# Patient Record
Sex: MALE | Race: WHITE | Employment: OTHER | URBAN - METROPOLITAN AREA
[De-identification: names, ages, dates, MRNs, and addresses within clinical notes are randomized per-mention and may not be internally consistent; named-entity substitution may affect disease eponyms.]

---

## 2018-03-23 RX ORDER — GUAIFENESIN 100 MG/5ML
81 LIQUID (ML) ORAL DAILY
COMMUNITY

## 2018-03-23 NOTE — PROGRESS NOTES
PT aware of NPO status. NPO after MN night prior to procedure. PT aware of need to hold anticoagulants per protocol. Last dose of baby aspirin Friday 3/23/18 at 0530. PT aware of potential for sedation administration and need for  at discharge. PT aware of arrival time pre procedure. Arrive at THE [facility] radiology waiting room on 3/28/18 at [address] Pt states no questions at this time. Jennifer Sharp pt [facility] radiology [phone].

## 2018-03-28 ENCOUNTER — HOSPITAL ENCOUNTER (OUTPATIENT)
Dept: CT IMAGING | Age: 73
Discharge: HOME | End: 2018-03-28
Attending: UROLOGY | Admitting: RADIOLOGY
Payer: MEDICARE

## 2018-03-28 ENCOUNTER — APPOINTMENT (OUTPATIENT)
Dept: GENERAL RADIOLOGY | Age: 73
End: 2018-03-28
Attending: RADIOLOGY
Payer: MEDICARE

## 2018-03-28 VITALS
DIASTOLIC BLOOD PRESSURE: 78 MMHG | HEIGHT: 73 IN | WEIGHT: 241.5 LBS | BODY MASS INDEX: 32.01 KG/M2 | RESPIRATION RATE: 16 BRPM | HEART RATE: 66 BPM | SYSTOLIC BLOOD PRESSURE: 132 MMHG | OXYGEN SATURATION: 97 % | TEMPERATURE: 97.9 F

## 2018-03-28 DIAGNOSIS — N28.1 CYST OF KIDNEY, ACQUIRED: ICD-10-CM

## 2018-03-28 LAB
ANION GAP SERPL CALC-SCNC: 8 MMOL/L (ref 3–18)
APTT PPP: 28.3 SEC (ref 23–36.4)
BASOPHILS # BLD: 0 K/UL (ref 0–0.06)
BASOPHILS NFR BLD: 0 % (ref 0–2)
BUN SERPL-MCNC: 21 MG/DL (ref 7–18)
BUN/CREAT SERPL: 17 (ref 12–20)
CALCIUM SERPL-MCNC: 8.8 MG/DL (ref 8.5–10.1)
CHLORIDE SERPL-SCNC: 110 MMOL/L (ref 100–108)
CO2 SERPL-SCNC: 27 MMOL/L (ref 21–32)
CREAT SERPL-MCNC: 1.25 MG/DL (ref 0.6–1.3)
DIFFERENTIAL METHOD BLD: ABNORMAL
EOSINOPHIL # BLD: 0.1 K/UL (ref 0–0.4)
EOSINOPHIL NFR BLD: 2 % (ref 0–5)
ERYTHROCYTE [DISTWIDTH] IN BLOOD BY AUTOMATED COUNT: 12.3 % (ref 11.6–14.5)
GLUCOSE SERPL-MCNC: 107 MG/DL (ref 74–99)
HCT VFR BLD AUTO: 45.1 % (ref 36–48)
HGB BLD-MCNC: 15.1 G/DL (ref 13–16)
INR PPP: 1 (ref 0.8–1.2)
LYMPHOCYTES # BLD: 1.5 K/UL (ref 0.9–3.6)
LYMPHOCYTES NFR BLD: 31 % (ref 21–52)
MCH RBC QN AUTO: 31 PG (ref 24–34)
MCHC RBC AUTO-ENTMCNC: 33.5 G/DL (ref 31–37)
MCV RBC AUTO: 92.6 FL (ref 74–97)
MONOCYTES # BLD: 0.6 K/UL (ref 0.05–1.2)
MONOCYTES NFR BLD: 13 % (ref 3–10)
NEUTS SEG # BLD: 2.6 K/UL (ref 1.8–8)
NEUTS SEG NFR BLD: 54 % (ref 40–73)
PLATELET # BLD AUTO: 194 K/UL (ref 135–420)
PMV BLD AUTO: 10.9 FL (ref 9.2–11.8)
POTASSIUM SERPL-SCNC: 4.5 MMOL/L (ref 3.5–5.5)
PROTHROMBIN TIME: 12.5 SEC (ref 11.5–15.2)
RBC # BLD AUTO: 4.87 M/UL (ref 4.7–5.5)
SODIUM SERPL-SCNC: 145 MMOL/L (ref 136–145)
WBC # BLD AUTO: 4.8 K/UL (ref 4.6–13.2)

## 2018-03-28 PROCEDURE — 85025 COMPLETE CBC W/AUTO DIFF WBC: CPT | Performed by: UROLOGY

## 2018-03-28 PROCEDURE — 74011000250 HC RX REV CODE- 250

## 2018-03-28 PROCEDURE — 71046 X-RAY EXAM CHEST 2 VIEWS: CPT

## 2018-03-28 PROCEDURE — 80048 BASIC METABOLIC PNL TOTAL CA: CPT | Performed by: UROLOGY

## 2018-03-28 PROCEDURE — 88305 TISSUE EXAM BY PATHOLOGIST: CPT | Performed by: UROLOGY

## 2018-03-28 PROCEDURE — 74011000250 HC RX REV CODE- 250: Performed by: RADIOLOGY

## 2018-03-28 PROCEDURE — 74011250636 HC RX REV CODE- 250/636

## 2018-03-28 PROCEDURE — 74011250636 HC RX REV CODE- 250/636: Performed by: RADIOLOGY

## 2018-03-28 PROCEDURE — 88112 CYTOPATH CELL ENHANCE TECH: CPT | Performed by: UROLOGY

## 2018-03-28 PROCEDURE — 85610 PROTHROMBIN TIME: CPT | Performed by: UROLOGY

## 2018-03-28 PROCEDURE — 85730 THROMBOPLASTIN TIME PARTIAL: CPT | Performed by: UROLOGY

## 2018-03-28 PROCEDURE — 77012 CT SCAN FOR NEEDLE BIOPSY: CPT

## 2018-03-28 RX ORDER — SODIUM BICARBONATE 1 MEQ/ML
SYRINGE (ML) INTRAVENOUS
Status: COMPLETED
Start: 2018-03-28 | End: 2018-03-28

## 2018-03-28 RX ORDER — SODIUM CHLORIDE 9 MG/ML
25 INJECTION, SOLUTION INTRAVENOUS CONTINUOUS
Status: DISCONTINUED | OUTPATIENT
Start: 2018-03-28 | End: 2018-03-28 | Stop reason: HOSPADM

## 2018-03-28 RX ORDER — LIDOCAINE HYDROCHLORIDE 10 MG/ML
INJECTION, SOLUTION EPIDURAL; INFILTRATION; INTRACAUDAL; PERINEURAL
Status: DISCONTINUED
Start: 2018-03-28 | End: 2018-03-28 | Stop reason: HOSPADM

## 2018-03-28 RX ORDER — FLUMAZENIL 0.1 MG/ML
0.2 INJECTION INTRAVENOUS
Status: DISCONTINUED | OUTPATIENT
Start: 2018-03-28 | End: 2018-03-28 | Stop reason: HOSPADM

## 2018-03-28 RX ORDER — LIDOCAINE HYDROCHLORIDE 10 MG/ML
1-20 INJECTION INFILTRATION; PERINEURAL
Status: DISCONTINUED | OUTPATIENT
Start: 2018-03-28 | End: 2018-03-28 | Stop reason: HOSPADM

## 2018-03-28 RX ORDER — MIDAZOLAM HYDROCHLORIDE 1 MG/ML
INJECTION, SOLUTION INTRAMUSCULAR; INTRAVENOUS
Status: DISCONTINUED
Start: 2018-03-28 | End: 2018-03-28 | Stop reason: HOSPADM

## 2018-03-28 RX ORDER — FENTANYL CITRATE 50 UG/ML
25-200 INJECTION, SOLUTION INTRAMUSCULAR; INTRAVENOUS
Status: DISCONTINUED | OUTPATIENT
Start: 2018-03-28 | End: 2018-03-28 | Stop reason: HOSPADM

## 2018-03-28 RX ORDER — SODIUM BICARBONATE 84 MG/ML
1 INJECTION, SOLUTION INTRAVENOUS
Status: DISCONTINUED | OUTPATIENT
Start: 2018-03-28 | End: 2018-03-28 | Stop reason: HOSPADM

## 2018-03-28 RX ORDER — NALOXONE HYDROCHLORIDE 0.4 MG/ML
0.1 INJECTION, SOLUTION INTRAMUSCULAR; INTRAVENOUS; SUBCUTANEOUS AS NEEDED
Status: DISCONTINUED | OUTPATIENT
Start: 2018-03-28 | End: 2018-03-28 | Stop reason: HOSPADM

## 2018-03-28 RX ADMIN — LIDOCAINE HYDROCHLORIDE 9 ML: 10 INJECTION, SOLUTION INFILTRATION; PERINEURAL at 11:33

## 2018-03-28 RX ADMIN — SODIUM CHLORIDE 25 ML/HR: 900 INJECTION, SOLUTION INTRAVENOUS at 09:16

## 2018-03-28 RX ADMIN — SODIUM BICARBONATE 1 MEQ: 84 INJECTION, SOLUTION INTRAVENOUS at 11:33

## 2018-03-28 NOTE — PROGRESS NOTES
Modality for renal cyst drainage changed from CT to US per Dr. Chante Cabrales request/preference (interventional radiologist). See [facility] order.

## 2018-03-28 NOTE — PROGRESS NOTES
VASCULAR & INTERVENTIONAL RADIOLOGY PROGRESS NOTE    History and Physical reviewed; I have examined the patient and there are no pertinent changes. Pt is an appropriate candidate to undergo right renal cyst aspiration under moderate sedation.       Sonja Landaverde MD, MD  Vascular & Interventional Radiology  [facility]  3/28/2018

## 2018-03-28 NOTE — PERIOP NOTES
Reviewed PTA medication list with patient/caregiver and patient/caregiver denies any additional medications. Patient admits to having a responsible adult care for them for at least 24 hours after surgery.     0925 am- pt is prepped and ready for the procedure.

## 2018-03-28 NOTE — DISCHARGE INSTRUCTIONS
DISCHARGE SUMMARY from Nurse    PATIENT INSTRUCTIONS:    After general anesthesia or intravenous sedation, for 24 hours or while taking prescription Narcotics:  · Limit your activities  · Do not drive and operate hazardous machinery  · Do not make important personal or business decisions  · Do  not drink alcoholic beverages  · If you have not urinated within 8 hours after discharge, please contact your surgeon on call. Report the following to your surgeon:  · Excessive pain, swelling, redness or odor of or around the surgical area  · Temperature over 100.5  · Nausea and vomiting lasting longer than 4 hours or if unable to take medications  · Any signs of decreased circulation or nerve impairment to extremity: change in color, persistent  numbness, tingling, coldness or increase pain  · Any questions    What to do at Home:  Recommended activity: Activity as tolerated and no driving for today,     *  Please give a list of your current medications to your Primary Care Provider. *  Please update this list whenever your medications are discontinued, doses are      changed, or new medications (including over-the-counter products) are added. *  Please carry medication information at all times in case of emergency situations. These are general instructions for a healthy lifestyle:    No smoking/ No tobacco products/ Avoid exposure to second hand smoke  Surgeon General's Warning:  Quitting smoking now greatly reduces serious risk to your health. Obesity, smoking, and sedentary lifestyle greatly increases your risk for illness    A healthy diet, regular physical exercise & weight monitoring are important for maintaining a healthy lifestyle    You may be retaining fluid if you have a history of heart failure or if you experience any of the following symptoms:  Weight gain of 3 pounds or more overnight or 5 pounds in a week, increased swelling in our hands or feet or shortness of breath while lying flat in bed. Please call your doctor as soon as you notice any of these symptoms; do not wait until your next office visit. Recognize signs and symptoms of STROKE:    F-face looks uneven    A-arms unable to move or move unevenly    S-speech slurred or non-existent    T-time-call 911 as soon as signs and symptoms begin-DO NOT go       Back to bed or wait to see if you get better-TIME IS BRAIN. Warning Signs of HEART ATTACK     Call 911 if you have these symptoms:   Chest discomfort. Most heart attacks involve discomfort in the center of the chest that lasts more than a few minutes, or that goes away and comes back. It can feel like uncomfortable pressure, squeezing, fullness, or pain.  Discomfort in other areas of the upper body. Symptoms can include pain or discomfort in one or both arms, the back, neck, jaw, or stomach.  Shortness of breath with or without chest discomfort.  Other signs may include breaking out in a cold sweat, nausea, or lightheadedness. Don't wait more than five minutes to call 911 - MINUTES MATTER! Fast action can save your life. Calling 911 is almost always the fastest way to get lifesaving treatment. Emergency Medical Services staff can begin treatment when they arrive -- up to an hour sooner than if someone gets to the hospital by car. The discharge information has been reviewed with the patient. The patient verbalized understanding. Discharge medications reviewed with the patient and appropriate educational materials and side effects teaching were provided.   ______________________Patient armband removed and shredded  _____________________________________________________________________________________________________________

## 2018-03-28 NOTE — PROGRESS NOTES
Discharge inst given and reviewed with pt and sig other,  Both verbalize understanding. Dressing to right chest area clean, dry and intact .   Dr Rosana Longo    office called, will call pt this afternoon to arrange follow up  Arm bands removed and shredded

## 2018-03-28 NOTE — PROGRESS NOTES
115ml clear yellow urine drained from right renal cyst by Dr. Calloway  via CT guidance. Specimen sent to lab for cytology per Dr. Roberta Rodriguez written order.

## 2018-03-28 NOTE — PROGRESS NOTES
TRANSFER - OUT REPORT:    Verbal report given to FABIOLA Ma(name) on Chipper Blizzard  being transferred to Care Unit(unit) for routine progression of care       Report consisted of patients Situation, Background, Assessment and   Recommendations(SBAR). Information from the following report(s) SBAR, Kardex and MAR was reviewed with the receiving nurse. Lines:   Peripheral IV 03/28/18 Right; Lower Forearm (Active)   Site Assessment Clean, dry, & intact 3/28/2018  9:16 AM   Phlebitis Assessment 0 3/28/2018  9:16 AM   Infiltration Assessment 0 3/28/2018  9:16 AM   Dressing Status Clean, dry, & intact 3/28/2018  9:16 AM   Dressing Type Tape;Transparent 3/28/2018  9:16 AM   Hub Color/Line Status Pink; Infusing 3/28/2018  9:16 AM        Opportunity for questions and clarification was provided.       Patient transported with:   Registered Nurse

## 2018-03-28 NOTE — PROGRESS NOTES
Procedure changed from ultrasound to CT per IR prefeference. Pt arrived on unit; Pt Alert and Oriented; Consent signed; See chart for sedation report and/or vital signs. Pt transferred to treatment table for procedure.

## 2018-03-28 NOTE — IP AVS SNAPSHOT
Maryjane Moctezuma 
 
 
 [address] [facility] [address] 
[phone] Patient: Kyra Habermann MRN: [id number] BXT:[id number] About your hospitalization You were admitted on:  March 28, 2018 You last received care in the:  [address] You were discharged on:  March 28, 2018 Why you were hospitalized Your primary diagnosis was:  Not on File Follow-up Information Follow up With Details Comments Contact Info Bandar Hung MD On 3/28/2018 F/U AS SCHEDULE [address] [facility] 
[phone] Jami Ceja MD   Patient can only remember the practice name and not the physician Discharge Orders None A check fidelia indicates which time of day the medication should be taken. My Medications CONTINUE taking these medications Instructions Each Dose to Equal  
 Morning Noon Evening Bedtime  
 aspirin 81 mg chewable tablet Your last dose was: Your next dose is: Take 81 mg by mouth daily. 81 mg  
    
   
   
   
  
 LISINOPRIL PO Your last dose was: Your next dose is: Take  by mouth daily. Omega-3 Fatty Acids 60- mg Cpdr  
   
Your last dose was: Your next dose is: Take  by mouth daily. OTHER Your last dose was: Your next dose is:    
   
   
 1 Tab. \"statin\" 1 Tab VERAPAMIL PO Your last dose was: Your next dose is: Take  by mouth. Discharge Instructions DISCHARGE SUMMARY from Nurse PATIENT INSTRUCTIONS: 
 
 
F-face looks uneven A-arms unable to move or move unevenly S-speech slurred or non-existent T-time-call 911 as soon as signs and symptoms begin-DO NOT go  
 Back to bed or wait to see if you get better-TIME IS BRAIN. Warning Signs of HEART ATTACK Call 911 if you have these symptoms: 
? Chest discomfort. Most heart attacks involve discomfort in the center of the chest that lasts more than a few minutes, or that goes away and comes back. It can feel like uncomfortable pressure, squeezing, fullness, or pain. ? Discomfort in other areas of the upper body. Symptoms can include pain or discomfort in one or both arms, the back, neck, jaw, or stomach. ? Shortness of breath with or without chest discomfort. ? Other signs may include breaking out in a cold sweat, nausea, or lightheadedness. Don't wait more than five minutes to call [address]! Fast action can save your life. Calling 911 is almost always the fastest way to get lifesaving treatment. Emergency Medical Services staff can begin treatment when they arrive  up to an hour sooner than if someone gets to the hospital by car. The discharge information has been reviewed with the patient. The patient verbalized understanding. Discharge medications reviewed with the patient and appropriate educational materials and side effects teaching were provided. ______________________Patient armband removed and shredded 
_____________________________________________________________________________________________________________ Introducing [facility]! [employer] introduces "[employer]" patient portal. Now you can access parts of your medical record, email your doctor's office, and request medication refills online. 1. In your internet browser, go to [email or web address] 2. Click on the First Time User? Click Here link in the Sign In box. You will see the New Member Sign Up page. 3. Enter your "[employer]" Access Code exactly as it appears below. You will not need to use this code after youve completed the sign-up process.  If you do not sign up before the expiration date, you must request a new code. · "[employer]" Access Code: [email or web address] Expires: 6/17/2018  1:36 PM 
 
4. Enter the last four digits of your Social Security Number (xxxx) and Date of Birth (mm/dd/yyyy) as indicated and click Submit. You will be taken to the next sign-up page. 5. Create a [employer] ID. This will be your "[employer]" login ID and cannot be changed, so think of one that is secure and easy to remember. 6. Create a "[employer]" password. You can change your password at any time. 7. Enter your Password Reset Question and Answer. This can be used at a later time if you forget your password. 8. Enter your e-mail address. You will receive e-mail notification when new information is available in [address] 9. Click Sign Up. You can now view and download portions of your medical record. 10. Click the Download Summary menu link to download a portable copy of your medical information. If you have questions, please visit the Frequently Asked Questions section of the "[employer]" website. Remember, "[employer]" is NOT to be used for urgent needs. For medical emergencies, dial 911. Now available from your iPhone and Android! Introducing Edmundo Bullock As a [facility] patient, I wanted to make you aware of our electronic visit tool called Edmundo Bullock. [facility] 24/7 allows you to connect within minutes with a medical provider 24 hours a day, seven days a week via a mobile device or tablet or logging into a secure website from your computer. You can access Edmundo Bullock from anywhere in the [place].  
 
A virtual visit might be right for you when you have a simple condition and feel like you just dont want to get out of bed, or cant get away from work for an appointment, when your regular [facility] provider is not available (evenings, weekends or holidays), or when youre out of town and need minor care. Electronic visits cost only $49 and if the [address] [place] [address] 24/7 provider determines a prescription is needed to treat your condition, one can be electronically transmitted to a nearby pharmacy*. Please take a moment to enroll today if you have not already done so. The enrollment process is free and takes just a few minutes. To enroll, please download the [employer] [facility] 24/7 nisa to your tablet or phone, or visit [email or web address] to enroll on your computer. And, as an [address] [place] [address] patient with a [employer] account, the results of your visits will be scanned into your electronic medical record and your primary care provider will be able to view the scanned results. We urge you to continue to see your regular [address] [place] [address] provider for your ongoing medical care. And while your primary care provider may not be the one available when you seek a [employer] virtual visit, the peace of mind you get from getting a real diagnosis real time can be priceless. For more information on [employer], view our Frequently Asked Questions (FAQs) at [email or web address]. Sincerely, 
 
Rubén Sandoval MD 
Chief Medical Officer [address] *:  certain medications cannot be prescribed via [employer] Providers Seen During Your Hospitalization Provider Specialty Primary office phone Ashwini Martin MD Urology [phone] Your Primary Care Physician (PCP) Primary Care Physician Office Phone Office Fax OTHER, PHYS ** None ** ** None ** You are allergic to the following No active allergies Recent Documentation Height Weight BMI Smoking Status 1.854 m 109.5 kg 31.86 kg/m2 Never Smoker Emergency Contacts Name Discharge Info Relation Home Work [facility] DISCHARGE CAREGIVER [3] Friend [5]   [phone] Patient Belongings The following personal items are in your possession at time of discharge: 
                             
 
  
  
 Please provide this summary of care documentation to your next provider. Signatures-by signing, you are acknowledging that this After Visit Summary has been reviewed with you and you have received a copy. Patient Signature:  ____________________________________________________________ Date:  ____________________________________________________________  
  
[facility] Provider Signature:  ____________________________________________________________ Date:  ____________________________________________________________

## 2018-08-07 ENCOUNTER — HOSPITAL ENCOUNTER (OUTPATIENT)
Dept: CT IMAGING | Age: 73
Discharge: HOME | End: 2018-08-07
Attending: RADIOLOGY | Admitting: RADIOLOGY
Payer: MEDICARE

## 2018-08-07 ENCOUNTER — APPOINTMENT (OUTPATIENT)
Dept: CT IMAGING | Age: 73
End: 2018-08-07
Attending: UROLOGY
Payer: MEDICARE

## 2018-08-07 VITALS
RESPIRATION RATE: 20 BRPM | OXYGEN SATURATION: 97 % | WEIGHT: 235 LBS | TEMPERATURE: 97.5 F | SYSTOLIC BLOOD PRESSURE: 110 MMHG | BODY MASS INDEX: 31.14 KG/M2 | HEART RATE: 50 BPM | HEIGHT: 73 IN | DIASTOLIC BLOOD PRESSURE: 50 MMHG

## 2018-08-07 DIAGNOSIS — N28.1 RENAL CYST: ICD-10-CM

## 2018-08-07 DIAGNOSIS — N28.1 CYST OF KIDNEY, ACQUIRED: ICD-10-CM

## 2018-08-07 LAB — APTT PPP: 27.2 SEC (ref 23–36.4)

## 2018-08-07 PROCEDURE — 76942 ECHO GUIDE FOR BIOPSY: CPT

## 2018-08-07 PROCEDURE — 77012 CT SCAN FOR NEEDLE BIOPSY: CPT

## 2018-08-07 PROCEDURE — 99152 MOD SED SAME PHYS/QHP 5/>YRS: CPT

## 2018-08-07 PROCEDURE — 36415 COLL VENOUS BLD VENIPUNCTURE: CPT | Performed by: UROLOGY

## 2018-08-07 PROCEDURE — 85730 THROMBOPLASTIN TIME PARTIAL: CPT | Performed by: UROLOGY

## 2018-08-07 PROCEDURE — 74011000250 HC RX REV CODE- 250: Performed by: RADIOLOGY

## 2018-08-07 PROCEDURE — 74011250636 HC RX REV CODE- 250/636: Performed by: RADIOLOGY

## 2018-08-07 PROCEDURE — 76080 X-RAY EXAM OF FISTULA: CPT

## 2018-08-07 PROCEDURE — 99153 MOD SED SAME PHYS/QHP EA: CPT

## 2018-08-07 PROCEDURE — 74011000250 HC RX REV CODE- 250

## 2018-08-07 RX ORDER — SODIUM CHLORIDE 9 MG/ML
25 INJECTION, SOLUTION INTRAVENOUS CONTINUOUS
Status: DISCONTINUED | OUTPATIENT
Start: 2018-08-07 | End: 2018-08-07 | Stop reason: HOSPADM

## 2018-08-07 RX ORDER — SODIUM BICARBONATE 1 MEQ/ML
SYRINGE (ML) INTRAVENOUS
Status: COMPLETED
Start: 2018-08-07 | End: 2018-08-07

## 2018-08-07 RX ORDER — LIDOCAINE HYDROCHLORIDE 10 MG/ML
1-20 INJECTION INFILTRATION; PERINEURAL
Status: DISCONTINUED | OUTPATIENT
Start: 2018-08-07 | End: 2018-08-07 | Stop reason: HOSPADM

## 2018-08-07 RX ORDER — FENTANYL CITRATE 50 UG/ML
25-200 INJECTION, SOLUTION INTRAMUSCULAR; INTRAVENOUS
Status: DISCONTINUED | OUTPATIENT
Start: 2018-08-07 | End: 2018-08-07 | Stop reason: HOSPADM

## 2018-08-07 RX ORDER — SODIUM BICARBONATE 84 MG/ML
1 INJECTION, SOLUTION INTRAVENOUS
Status: DISCONTINUED | OUTPATIENT
Start: 2018-08-07 | End: 2018-08-07 | Stop reason: HOSPADM

## 2018-08-07 RX ORDER — NALOXONE HYDROCHLORIDE 0.4 MG/ML
0.2 INJECTION, SOLUTION INTRAMUSCULAR; INTRAVENOUS; SUBCUTANEOUS AS NEEDED
Status: DISCONTINUED | OUTPATIENT
Start: 2018-08-07 | End: 2018-08-07 | Stop reason: HOSPADM

## 2018-08-07 RX ORDER — MIDAZOLAM HYDROCHLORIDE 1 MG/ML
.5-4 INJECTION, SOLUTION INTRAMUSCULAR; INTRAVENOUS
Status: DISCONTINUED | OUTPATIENT
Start: 2018-08-07 | End: 2018-08-07 | Stop reason: HOSPADM

## 2018-08-07 RX ORDER — FLUMAZENIL 0.1 MG/ML
0.2 INJECTION INTRAVENOUS
Status: DISCONTINUED | OUTPATIENT
Start: 2018-08-07 | End: 2018-08-07 | Stop reason: HOSPADM

## 2018-08-07 RX ADMIN — FENTANYL CITRATE 25 MCG: 50 INJECTION, SOLUTION INTRAMUSCULAR; INTRAVENOUS at 12:28

## 2018-08-07 RX ADMIN — ALCOHOL 65 ML: 0.98 INJECTION INTRASPINAL at 12:41

## 2018-08-07 RX ADMIN — MIDAZOLAM HYDROCHLORIDE 1 MG: 1 INJECTION, SOLUTION INTRAMUSCULAR; INTRAVENOUS at 12:43

## 2018-08-07 RX ADMIN — FENTANYL CITRATE 50 MCG: 50 INJECTION, SOLUTION INTRAMUSCULAR; INTRAVENOUS at 12:43

## 2018-08-07 RX ADMIN — LIDOCAINE HYDROCHLORIDE 9 ML: 10 INJECTION, SOLUTION INFILTRATION; PERINEURAL at 12:12

## 2018-08-07 RX ADMIN — SODIUM BICARBONATE 1 MEQ: 84 INJECTION, SOLUTION INTRAVENOUS at 12:12

## 2018-08-07 RX ADMIN — FENTANYL CITRATE 50 MCG: 50 INJECTION, SOLUTION INTRAMUSCULAR; INTRAVENOUS at 12:20

## 2018-08-07 RX ADMIN — FENTANYL CITRATE 50 MCG: 50 INJECTION, SOLUTION INTRAMUSCULAR; INTRAVENOUS at 12:11

## 2018-08-07 RX ADMIN — SODIUM CHLORIDE 25 ML/HR: 900 INJECTION, SOLUTION INTRAVENOUS at 09:23

## 2018-08-07 RX ADMIN — MIDAZOLAM HYDROCHLORIDE 1 MG: 1 INJECTION, SOLUTION INTRAMUSCULAR; INTRAVENOUS at 12:20

## 2018-08-07 RX ADMIN — MIDAZOLAM HYDROCHLORIDE 1 MG: 1 INJECTION, SOLUTION INTRAMUSCULAR; INTRAVENOUS at 12:28

## 2018-08-07 RX ADMIN — MIDAZOLAM HYDROCHLORIDE 1 MG: 1 INJECTION, SOLUTION INTRAMUSCULAR; INTRAVENOUS at 12:11

## 2018-08-07 NOTE — PROGRESS NOTES
Pt is all prepped and ready for procedure. 1600 Pt discharged home in the care of spouse in stable condition with no c/o pain.

## 2018-08-07 NOTE — DISCHARGE INSTRUCTIONS
Wound Care: After Your Visit  Your Care Instructions  Taking good care of your wound at home will help it heal quickly and reduce your chance of infection. The doctor has checked you carefully, but problems can develop later. If you notice any problems or new symptoms, get medical treatment right away. Follow-up care is a key part of your treatment and safety. Be sure to make and go to all appointments, and call your doctor if you are having problems. It's also a good idea to know your test results and keep a list of the medicines you take. How can you care for yourself at home? · Clean the area with soap and water 2 times a day unless your doctor gives you different instructions. Don't use hydrogen peroxide or alcohol, which can slow healing. ¨ You may cover the wound with a thin layer of antibiotic ointment, such as bacitracin, and a nonstick bandage. ¨ Apply more ointment and replace the bandage as needed. · Take pain medicines exactly as directed. Some pain is normal with a wound, but do not ignore pain that is getting worse instead of better. You could have an infection. ¨ If the doctor gave you a prescription medicine for pain, take it as prescribed. ¨ If you are not taking a prescription pain medicine, ask your doctor if you can take an over-the-counter medicine. · Your doctor may have closed your wound with stitches (sutures), staples, or skin glue. ¨ If you have stitches, your doctor may remove them after several days to 2 weeks. Or you may have stitches that dissolve on their own. ¨ If you have staples, your doctor may remove them after 7 to 10 days. ¨ If your wound was closed with skin glue, the glue will wear off in a few days to 2 weeks. When should you call for help? Call your doctor now or seek immediate medical care if:  · You have signs of infection, such as:  ¨ Increased pain, swelling, warmth, or redness near the wound. ¨ Red streaks leading from the wound.   ¨ Pus draining from the wound. ¨ A fever. · You bleed so much from your incision that you soak one or more bandages over 2 to 4 hours. Watch closely for changes in your health, and be sure to contact your doctor if:  · The wound is not getting better each day. Where can you learn more? Go to I and love and you.be  Enter M973 in the search box to learn more about \"Wound Care: After Your Visit. \"   © 0566-9046 Healthwise, Incorporated. Care instructions adapted under license by [facility] (which disclaims liability or warranty for this information). This care instruction is for use with your licensed healthcare professional. If you have questions about a medical condition or this instruction, always ask your healthcare professional. [address] any warranty or liability for your use of this information. Content Version: 65.5.792947; Last Revised: April 23, 2012              DISCHARGE SUMMARY from Nurse    PATIENT INSTRUCTIONS:    After general anesthesia or intravenous sedation, for 24 hours or while taking prescription Narcotics:  · Limit your activities  · Do not drive and operate hazardous machinery  · Do not make important personal or business decisions  · Do  not drink alcoholic beverages  · If you have not urinated within 8 hours after discharge, please contact your surgeon on call. Report the following to your surgeon:  · Excessive pain, swelling, redness or odor of or around the surgical area  · Temperature over 100.5  · Nausea and vomiting lasting longer than 4 hours or if unable to take medications  · Any signs of decreased circulation or nerve impairment to extremity: change in color, persistent  numbness, tingling, coldness or increase pain  · Any questions    What to do at Home:  Recommended activity: Activity as tolerated,       *  Please give a list of your current medications to your Primary Care Provider.     *  Please update this list whenever your medications are discontinued, doses are      changed, or new medications (including over-the-counter products) are added. *  Please carry medication information at all times in case of emergency situations. These are general instructions for a healthy lifestyle:    No smoking/ No tobacco products/ Avoid exposure to second hand smoke  Surgeon General's Warning:  Quitting smoking now greatly reduces serious risk to your health. Obesity, smoking, and sedentary lifestyle greatly increases your risk for illness    A healthy diet, regular physical exercise & weight monitoring are important for maintaining a healthy lifestyle    You may be retaining fluid if you have a history of heart failure or if you experience any of the following symptoms:  Weight gain of 3 pounds or more overnight or 5 pounds in a week, increased swelling in our hands or feet or shortness of breath while lying flat in bed. Please call your doctor as soon as you notice any of these symptoms; do not wait until your next office visit. Recognize signs and symptoms of STROKE:    F-face looks uneven    A-arms unable to move or move unevenly    S-speech slurred or non-existent    T-time-call 911 as soon as signs and symptoms begin-DO NOT go       Back to bed or wait to see if you get better-TIME IS BRAIN. Warning Signs of HEART ATTACK     Call 911 if you have these symptoms:   Chest discomfort. Most heart attacks involve discomfort in the center of the chest that lasts more than a few minutes, or that goes away and comes back. It can feel like uncomfortable pressure, squeezing, fullness, or pain.  Discomfort in other areas of the upper body. Symptoms can include pain or discomfort in one or both arms, the back, neck, jaw, or stomach.  Shortness of breath with or without chest discomfort.  Other signs may include breaking out in a cold sweat, nausea, or lightheadedness. Don't wait more than five minutes to call 911 - MINUTES MATTER!  Fast action can save your life. Calling 911 is almost always the fastest way to get lifesaving treatment. Emergency Medical Services staff can begin treatment when they arrive -- up to an hour sooner than if someone gets to the hospital by car. The discharge information has been reviewed with the patient and spouse. The patient and spouse verbalized understanding. Discharge medications reviewed with the patient and spouse and appropriate educational materials and side effects teaching were provided.       Patient armband removed and shredded    ___________________________________________________________________________________________________________________________________

## 2018-08-07 NOTE — IP AVS SNAPSHOT
[address] [address] [place] [address] 
[phone] Patient: Sterling Farr MRN: [id number] About your hospitalization You were admitted on:  August 7, 2018 You last received care in the:  [address] You were discharged on:  August 7, 2018 Why you were hospitalized Your primary diagnosis was:  Not on File Follow-up Information Follow up With Details Comments Contact Info Polly Vallejo MD   [address] [address] 
[phone] Discharge Orders None A check fidelia indicates which time of day the medication should be taken. My Medications ASK your doctor about these medications Instructions Each Dose to Equal  
 Morning Noon Evening Bedtime  
 aspirin 81 mg chewable tablet Your last dose was: Your next dose is: Take 81 mg by mouth daily. 81 mg  
    
   
   
   
  
 LISINOPRIL PO Your last dose was: Your next dose is: Take  by mouth daily. Omega-3 Fatty Acids 60- mg Cpdr  
   
Your last dose was: Your next dose is: Take  by mouth daily. OTHER Your last dose was: Your next dose is:    
   
   
 1 Tab. \"statin\" 1 Tab VERAPAMIL PO Your last dose was: Your next dose is: Take  by mouth. Discharge Instructions Wound Care: After Your Visit Your Care Instructions Taking good care of your wound at home will help it heal quickly and reduce your chance of infection. The doctor has checked you carefully, but problems can develop later. If you notice any problems or new symptoms, get medical treatment right away. Follow-up care is a key part of your treatment and safety.  Be sure to make and go to all appointments, and call your doctor if you are having problems. It's also a good idea to know your test results and keep a list of the medicines you take. How can you care for yourself at home? · Clean the area with soap and water 2 times a day unless your doctor gives you different instructions. Don't use hydrogen peroxide or alcohol, which can slow healing. ¨ You may cover the wound with a thin layer of antibiotic ointment, such as bacitracin, and a nonstick bandage. ¨ Apply more ointment and replace the bandage as needed. · Take pain medicines exactly as directed. Some pain is normal with a wound, but do not ignore pain that is getting worse instead of better. You could have an infection. ¨ If the doctor gave you a prescription medicine for pain, take it as prescribed. ¨ If you are not taking a prescription pain medicine, ask your doctor if you can take an over-the-counter medicine. · Your doctor may have closed your wound with stitches (sutures), staples, or skin glue. ¨ If you have stitches, your doctor may remove them after several days to 2 weeks. Or you may have stitches that dissolve on their own. ¨ If you have staples, your doctor may remove them after 7 to 10 days. ¨ If your wound was closed with skin glue, the glue will wear off in a few days to 2 weeks. When should you call for help? Call your doctor now or seek immediate medical care if: 
· You have signs of infection, such as: 
¨ Increased pain, swelling, warmth, or redness near the wound. ¨ Red streaks leading from the wound. ¨ Pus draining from the wound. ¨ A fever. · You bleed so much from your incision that you soak one or more bandages over 2 to 4 hours. Watch closely for changes in your health, and be sure to contact your doctor if: · The wound is not getting better each day. Where can you learn more? Go to DealExplorer.be Enter H656 in the search box to learn more about \"Wound Care: After Your Visit. \"  
© 4433-4274 Healthwise, Incorporated. Care instructions adapted under license by Jeanine Sinclair (which disclaims liability or warranty for this information). This care instruction is for use with your licensed healthcare professional. If you have questions about a medical condition or this instruction, always ask your healthcare professional. [address] any warranty or liability for your use of this information. Content Version: 70.0.793363; Last Revised: April 23, 2012 DISCHARGE SUMMARY from Nurse PATIENT INSTRUCTIONS: 
 
 
F-face looks uneven A-arms unable to move or move unevenly S-speech slurred or non-existent T-time-call 911 as soon as signs and symptoms begin-DO NOT go Back to bed or wait to see if you get better-TIME IS BRAIN. Warning Signs of HEART ATTACK Call 911 if you have these symptoms: 
? Chest discomfort. Most heart attacks involve discomfort in the center of the chest that lasts more than a few minutes, or that goes away and comes back. It can feel like uncomfortable pressure, squeezing, fullness, or pain. ? Discomfort in other areas of the upper body. Symptoms can include pain or discomfort in one or both arms, the back, neck, jaw, or stomach. ? Shortness of breath with or without chest discomfort. ? Other signs may include breaking out in a cold sweat, nausea, or lightheadedness. Don't wait more than five minutes to call [address]! Fast action can save your life. Calling 911 is almost always the fastest way to get lifesaving treatment. Emergency Medical Services staff can begin treatment when they arrive  up to an hour sooner than if someone gets to the hospital by car. The discharge information has been reviewed with the patient and spouse. The patient and spouse verbalized understanding. Discharge medications reviewed with the patient and spouse and appropriate educational materials and side effects teaching were provided. Patient armband removed and shredded 
 
___________________________________________________________________________________________________________________________________ Introducing [facility] HE[facility]! [facility] introduces [employer] patient portal. Now you can access parts of your medical record, email your doctor's office, and request medication refills online. 1. In your internet browser, go to [email or web address] 2. Click on the First Time User? Click Here link in the Sign In box. You will see the New Member Sign Up page. 3. Enter your [employer] Access Code exactly as it appears below. You will not need to use this code after youve completed the sign-up process. If you do not sign up before the expiration date, you must request a new code. · [employer] Access Code: [email or web address] Expires: 10/21/2018  1:32 PM 
 
4. Enter the last four digits of your Social Security Number (xxxx) and Date of Birth (mm/dd/yyyy) as indicated and click Submit. You will be taken to the next sign-up page. 5. Create a RBM [employer] ID. This will be your [employer] login ID and cannot be changed, so think of one that is secure and easy to remember. 6. Create a [employer] password. You can change your password at any time. 7. Enter your Password Reset Question and Answer. This can be used at a later time if you forget your password. 8. Enter your e-mail address. You will receive e-mail notification when new information is available in [address] 9. Click Sign Up. You can now view and download portions of your medical record. 10. Click the Download Summary menu link to download a portable copy of your medical information.  
 
If you have questions, please visit the Frequently Asked Questions section of the Cadent. Remember, RoboCVhart is NOT to be used for urgent needs. For medical emergencies, dial 911. Now available from your iPhone and Android! Introducing Edmundo Bullock As a [facility] patient, I wanted to make you aware of our electronic visit tool called Edmundo Bullock. [facility] 24/7 allows you to connect within minutes with a medical provider 24 hours a day, seven days a week via a mobile device or tablet or logging into a secure website from your computer. You can access Edmundo Bullock from anywhere in the [place]. A virtual visit might be right for you when you have a simple condition and feel like you just dont want to get out of bed, or cant get away from work for an appointment, when your regular [facility] provider is not available (evenings, weekends or holidays), or when youre out of town and need minor care. Electronic visits cost only $49 and if the [employer] 24/7 provider determines a prescription is needed to treat your condition, one can be electronically transmitted to a nearby pharmacy*. Please take a moment to enroll today if you have not already done so. The enrollment process is free and takes just a few minutes. To enroll, please download the Cherl Grain 24/7 nisa to your tablet or phone, or visit [email or web address] to enroll on your computer. And, as an [address] [place] [address] patient with a ColoWrap account, the results of your visits will be scanned into your electronic medical record and your primary care provider will be able to view the scanned results. We urge you to continue to see your regular [facility] provider for your ongoing medical care. And while your primary care provider may not be the one available when you seek a Edmundo Bullock virtual visit, the peace of mind you get from getting a real diagnosis real time can be priceless. For more information on Edmundo Bullock, view our Frequently Asked Questions (FAQs) at [email or web address]. Sincerely, 
 
Jaxon Holley MD 
Chief Medical Officer [employer] *:  certain medications cannot be prescribed via Edmundo Bullock Providers Seen During Your Hospitalization Provider Specialty Primary office phone Ian Coronel MD Radiology [phone] Your Primary Care Physician (PCP) Primary Care Physician Office Phone Office Fax Ivet [place]ates [phone] You are allergic to the following No active allergies Recent Documentation Height Weight BMI Smoking Status 1.854 m 106.6 kg 31 kg/m2 Never Smoker Emergency Contacts Name Discharge Info Relation Home Work Mobile [facility] DISCHARGE CAREGIVER [3] Friend [5]   [phone] Patient Belongings The following personal items are in your possession at time of discharge: 
     Visual Aid: None Please provide this summary of care documentation to your next provider. Signatures-by signing, you are acknowledging that this After Visit Summary has been reviewed with you and you have received a copy. Patient Signature:  ____________________________________________________________ Date:  ____________________________________________________________  
  
[facility] Provider Signature:  ____________________________________________________________ Date:  ____________________________________________________________

## 2018-08-07 NOTE — PROGRESS NOTES
TRANSFER - IN REPORT:    Verbal report received from [facility] on Judd Snyder  being received from [facility](unit) for ordered procedure      Report consisted of patients Situation, Background, Assessment and   Recommendations(SBAR). Information from the following report(s) SBAR, Kardex and MAR was reviewed with the receiving nurse. Opportunity for questions and clarification was provided. 180 ml of fluid  Removed. Assessment completed upon patients arrival to unit and care assumed.

## 2018-08-07 NOTE — PROCEDURES
Vascular & Interventional Radiology Brief Procedure Note    Interventional Radiologist: Danyel Norris MD    Pre-operative Diagnosis:  Renal cyst    Post-operative Diagnosis: Same as pre-op dx    Procedure(s) Performed:  Cyst sclerotherapy    Anesthesia:  Local and Moderate Sedation    Findings:  Uncomplicated R renal cyst ethanol sclerotherapy.      Complications: None    Estimated Blood Loss:  minimal    Tubes and Drains: None    Specimens: None    Condition: Good     Plan: Recc f/u CT in 3-4months to reassess cyst.       Danyel Norris MD  [facility]  Vascular & Interventional Radiology  8/7/2018

## 2018-08-07 NOTE — H&P
The patient is an appropriate candidate to undergo renal cyst sclerotherapy. Patient assessed immediately prior to induction. Anesthesia plan as follows:   Conscious Sedation. Planned agent(s):  fentanyl and versed    ASA Score:  ASA 2 - Mild systemic disease    History and Physical update:  H&P was reviewed and the patient was examined. No changes have occurred in the patient's condition since the H&P was completed.     Brooke Rebolledo MD  Vascular & Interventional Radiology  [address] [place] [address] [facility]  8/7/2018

## 2019-06-21 ENCOUNTER — HOSPITAL ENCOUNTER (INPATIENT)
Age: 74
LOS: 1 days | Discharge: HOME | DRG: 389 | End: 2019-06-23
Attending: EMERGENCY MEDICINE | Admitting: HOSPITALIST
Payer: MEDICARE

## 2019-06-21 ENCOUNTER — APPOINTMENT (OUTPATIENT)
Dept: CT IMAGING | Age: 74
DRG: 389 | End: 2019-06-21
Attending: PHYSICIAN ASSISTANT
Payer: MEDICARE

## 2019-06-21 ENCOUNTER — APPOINTMENT (OUTPATIENT)
Dept: GENERAL RADIOLOGY | Age: 74
DRG: 389 | End: 2019-06-21
Attending: FAMILY MEDICINE
Payer: MEDICARE

## 2019-06-21 DIAGNOSIS — R11.2 NAUSEA AND VOMITING, INTRACTABILITY OF VOMITING NOT SPECIFIED, UNSPECIFIED VOMITING TYPE: ICD-10-CM

## 2019-06-21 DIAGNOSIS — K56.600 PARTIAL SMALL BOWEL OBSTRUCTION (HCC): Primary | ICD-10-CM

## 2019-06-21 LAB
ALBUMIN SERPL-MCNC: 4.3 G/DL (ref 3.4–5)
ALBUMIN/GLOB SERPL: 1.2 {RATIO} (ref 0.8–1.7)
ALP SERPL-CCNC: 66 U/L (ref 45–117)
ALT SERPL-CCNC: 35 U/L (ref 16–61)
ANION GAP SERPL CALC-SCNC: 7 MMOL/L (ref 3–18)
APPEARANCE UR: CLEAR
AST SERPL-CCNC: 18 U/L (ref 15–37)
ATRIAL RATE: 78 BPM
BACTERIA URNS QL MICRO: ABNORMAL /HPF
BASOPHILS # BLD: 0 K/UL (ref 0–0.1)
BASOPHILS NFR BLD: 0 % (ref 0–2)
BILIRUB SERPL-MCNC: 0.8 MG/DL (ref 0.2–1)
BILIRUB UR QL: NEGATIVE
BUN SERPL-MCNC: 18 MG/DL (ref 7–18)
BUN/CREAT SERPL: 13 (ref 12–20)
CALCIUM SERPL-MCNC: 9.9 MG/DL (ref 8.5–10.1)
CALCULATED P AXIS, ECG09: 56 DEGREES
CALCULATED R AXIS, ECG10: -45 DEGREES
CALCULATED T AXIS, ECG11: 55 DEGREES
CHLORIDE SERPL-SCNC: 104 MMOL/L (ref 100–108)
CK MB CFR SERPL CALC: 2.7 % (ref 0–4)
CK MB SERPL-MCNC: 3.1 NG/ML (ref 5–25)
CK SERPL-CCNC: 113 U/L (ref 39–308)
CO2 SERPL-SCNC: 29 MMOL/L (ref 21–32)
COLOR UR: YELLOW
CREAT SERPL-MCNC: 1.34 MG/DL (ref 0.6–1.3)
DIAGNOSIS, 93000: NORMAL
DIFFERENTIAL METHOD BLD: ABNORMAL
EOSINOPHIL # BLD: 0 K/UL (ref 0–0.4)
EOSINOPHIL NFR BLD: 0 % (ref 0–5)
EPITH CASTS URNS QL MICRO: ABNORMAL /LPF (ref 0–5)
ERYTHROCYTE [DISTWIDTH] IN BLOOD BY AUTOMATED COUNT: 12.3 % (ref 11.6–14.5)
GLOBULIN SER CALC-MCNC: 3.5 G/DL (ref 2–4)
GLUCOSE SERPL-MCNC: 144 MG/DL (ref 74–99)
GLUCOSE UR STRIP.AUTO-MCNC: NEGATIVE MG/DL
HCT VFR BLD AUTO: 50 % (ref 36–48)
HGB BLD-MCNC: 17.3 G/DL (ref 13–16)
HGB UR QL STRIP: NEGATIVE
KETONES UR QL STRIP.AUTO: NEGATIVE MG/DL
LEUKOCYTE ESTERASE UR QL STRIP.AUTO: NEGATIVE
LIPASE SERPL-CCNC: 647 U/L (ref 73–393)
LYMPHOCYTES # BLD: 0.7 K/UL (ref 0.9–3.6)
LYMPHOCYTES NFR BLD: 5 % (ref 21–52)
MCH RBC QN AUTO: 32 PG (ref 24–34)
MCHC RBC AUTO-ENTMCNC: 34.6 G/DL (ref 31–37)
MCV RBC AUTO: 92.6 FL (ref 74–97)
MONOCYTES # BLD: 1.7 K/UL (ref 0.05–1.2)
MONOCYTES NFR BLD: 13 % (ref 3–10)
MUCOUS THREADS URNS QL MICRO: ABNORMAL /LPF
NEUTS SEG # BLD: 10.8 K/UL (ref 1.8–8)
NEUTS SEG NFR BLD: 82 % (ref 40–73)
NITRITE UR QL STRIP.AUTO: NEGATIVE
P-R INTERVAL, ECG05: 310 MS
PH UR STRIP: 5.5 [PH] (ref 5–8)
PLATELET # BLD AUTO: 235 K/UL (ref 135–420)
PMV BLD AUTO: 11 FL (ref 9.2–11.8)
POTASSIUM SERPL-SCNC: 4.2 MMOL/L (ref 3.5–5.5)
PROT SERPL-MCNC: 7.8 G/DL (ref 6.4–8.2)
PROT UR STRIP-MCNC: 30 MG/DL
Q-T INTERVAL, ECG07: 382 MS
QRS DURATION, ECG06: 96 MS
QTC CALCULATION (BEZET), ECG08: 435 MS
RBC # BLD AUTO: 5.4 M/UL (ref 4.7–5.5)
RBC #/AREA URNS HPF: ABNORMAL /HPF (ref 0–5)
SODIUM SERPL-SCNC: 140 MMOL/L (ref 136–145)
SP GR UR REFRACTOMETRY: 1.02 (ref 1–1.03)
TROPONIN I SERPL-MCNC: <0.02 NG/ML (ref 0–0.04)
UROBILINOGEN UR QL STRIP.AUTO: 1 EU/DL (ref 0.2–1)
VENTRICULAR RATE, ECG03: 78 BPM
WBC # BLD AUTO: 13.2 K/UL (ref 4.6–13.2)
WBC URNS QL MICRO: ABNORMAL /HPF (ref 0–5)

## 2019-06-21 PROCEDURE — 83690 ASSAY OF LIPASE: CPT

## 2019-06-21 PROCEDURE — 74011250636 HC RX REV CODE- 250/636: Performed by: INTERNAL MEDICINE

## 2019-06-21 PROCEDURE — 74011636320 HC RX REV CODE- 636/320: Performed by: EMERGENCY MEDICINE

## 2019-06-21 PROCEDURE — C9113 INJ PANTOPRAZOLE SODIUM, VIA: HCPCS | Performed by: FAMILY MEDICINE

## 2019-06-21 PROCEDURE — 96376 TX/PRO/DX INJ SAME DRUG ADON: CPT

## 2019-06-21 PROCEDURE — 80053 COMPREHEN METABOLIC PANEL: CPT

## 2019-06-21 PROCEDURE — 74011250636 HC RX REV CODE- 250/636: Performed by: FAMILY MEDICINE

## 2019-06-21 PROCEDURE — 96361 HYDRATE IV INFUSION ADD-ON: CPT

## 2019-06-21 PROCEDURE — 81001 URINALYSIS AUTO W/SCOPE: CPT

## 2019-06-21 PROCEDURE — 74011250636 HC RX REV CODE- 250/636: Performed by: PHYSICIAN ASSISTANT

## 2019-06-21 PROCEDURE — 99218 HC RM OBSERVATION: CPT

## 2019-06-21 PROCEDURE — 96372 THER/PROPH/DIAG INJ SC/IM: CPT

## 2019-06-21 PROCEDURE — 96374 THER/PROPH/DIAG INJ IV PUSH: CPT

## 2019-06-21 PROCEDURE — 85025 COMPLETE CBC W/AUTO DIFF WBC: CPT

## 2019-06-21 PROCEDURE — 96375 TX/PRO/DX INJ NEW DRUG ADDON: CPT

## 2019-06-21 PROCEDURE — 74011250637 HC RX REV CODE- 250/637: Performed by: INTERNAL MEDICINE

## 2019-06-21 PROCEDURE — 93005 ELECTROCARDIOGRAM TRACING: CPT

## 2019-06-21 PROCEDURE — 77030019563 HC DEV ATTCH FEED HOLL -A

## 2019-06-21 PROCEDURE — 74011000250 HC RX REV CODE- 250: Performed by: INTERNAL MEDICINE

## 2019-06-21 PROCEDURE — 74018 RADEX ABDOMEN 1 VIEW: CPT

## 2019-06-21 PROCEDURE — 82550 ASSAY OF CK (CPK): CPT

## 2019-06-21 PROCEDURE — 74177 CT ABD & PELVIS W/CONTRAST: CPT

## 2019-06-21 PROCEDURE — 99284 EMERGENCY DEPT VISIT MOD MDM: CPT

## 2019-06-21 PROCEDURE — C9113 INJ PANTOPRAZOLE SODIUM, VIA: HCPCS | Performed by: PHYSICIAN ASSISTANT

## 2019-06-21 RX ORDER — MORPHINE SULFATE 2 MG/ML
2 INJECTION, SOLUTION INTRAMUSCULAR; INTRAVENOUS
Status: DISCONTINUED | OUTPATIENT
Start: 2019-06-21 | End: 2019-06-23 | Stop reason: HOSPADM

## 2019-06-21 RX ORDER — FENTANYL CITRATE 50 UG/ML
50 INJECTION, SOLUTION INTRAMUSCULAR; INTRAVENOUS
Status: COMPLETED | OUTPATIENT
Start: 2019-06-21 | End: 2019-06-21

## 2019-06-21 RX ORDER — MORPHINE SULFATE 4 MG/ML
4 INJECTION INTRAVENOUS
Status: COMPLETED | OUTPATIENT
Start: 2019-06-21 | End: 2019-06-21

## 2019-06-21 RX ORDER — PANTOPRAZOLE SODIUM 40 MG/10ML
40 INJECTION, POWDER, LYOPHILIZED, FOR SOLUTION INTRAVENOUS
Status: COMPLETED | OUTPATIENT
Start: 2019-06-21 | End: 2019-06-21

## 2019-06-21 RX ORDER — ONDANSETRON 2 MG/ML
4 INJECTION INTRAMUSCULAR; INTRAVENOUS
Status: COMPLETED | OUTPATIENT
Start: 2019-06-21 | End: 2019-06-21

## 2019-06-21 RX ORDER — SODIUM CHLORIDE 9 MG/ML
125 INJECTION, SOLUTION INTRAVENOUS CONTINUOUS
Status: DISCONTINUED | OUTPATIENT
Start: 2019-06-21 | End: 2019-06-23

## 2019-06-21 RX ORDER — FAMOTIDINE 20 MG/1
20 TABLET, FILM COATED ORAL 2 TIMES DAILY
Status: DISCONTINUED | OUTPATIENT
Start: 2019-06-21 | End: 2019-06-21

## 2019-06-21 RX ORDER — HEPARIN SODIUM 5000 [USP'U]/ML
5000 INJECTION, SOLUTION INTRAVENOUS; SUBCUTANEOUS EVERY 8 HOURS
Status: DISCONTINUED | OUTPATIENT
Start: 2019-06-21 | End: 2019-06-23 | Stop reason: HOSPADM

## 2019-06-21 RX ORDER — LOSARTAN POTASSIUM 50 MG/1
TABLET ORAL DAILY
COMMUNITY

## 2019-06-21 RX ORDER — ONDANSETRON 2 MG/ML
4 INJECTION INTRAMUSCULAR; INTRAVENOUS
Status: DISCONTINUED | OUTPATIENT
Start: 2019-06-21 | End: 2019-06-23 | Stop reason: HOSPADM

## 2019-06-21 RX ORDER — DICYCLOMINE HYDROCHLORIDE 10 MG/ML
20 INJECTION INTRAMUSCULAR ONCE
Status: COMPLETED | OUTPATIENT
Start: 2019-06-21 | End: 2019-06-21

## 2019-06-21 RX ADMIN — PANTOPRAZOLE SODIUM 40 MG: 40 INJECTION, POWDER, FOR SOLUTION INTRAVENOUS at 11:54

## 2019-06-21 RX ADMIN — ONDANSETRON 4 MG: 2 INJECTION INTRAMUSCULAR; INTRAVENOUS at 14:46

## 2019-06-21 RX ADMIN — IOPAMIDOL 100 ML: 612 INJECTION, SOLUTION INTRAVENOUS at 13:29

## 2019-06-21 RX ADMIN — DICYCLOMINE HYDROCHLORIDE 20 MG: 20 INJECTION, SOLUTION INTRAMUSCULAR at 11:55

## 2019-06-21 RX ADMIN — HEPARIN SODIUM 5000 UNITS: 5000 INJECTION INTRAVENOUS; SUBCUTANEOUS at 18:17

## 2019-06-21 RX ADMIN — SODIUM CHLORIDE 1000 ML: 900 INJECTION, SOLUTION INTRAVENOUS at 11:54

## 2019-06-21 RX ADMIN — SODIUM CHLORIDE 1000 ML: 900 INJECTION, SOLUTION INTRAVENOUS at 14:46

## 2019-06-21 RX ADMIN — SODIUM CHLORIDE 125 ML/HR: 900 INJECTION, SOLUTION INTRAVENOUS at 23:22

## 2019-06-21 RX ADMIN — PANTOPRAZOLE SODIUM 40 MG: 40 INJECTION, POWDER, FOR SOLUTION INTRAVENOUS at 23:45

## 2019-06-21 RX ADMIN — HEPARIN SODIUM 5000 UNITS: 5000 INJECTION INTRAVENOUS; SUBCUTANEOUS at 23:45

## 2019-06-21 RX ADMIN — ONDANSETRON 4 MG: 2 INJECTION INTRAMUSCULAR; INTRAVENOUS at 11:54

## 2019-06-21 RX ADMIN — FENTANYL CITRATE 50 MCG: 50 INJECTION, SOLUTION INTRAMUSCULAR; INTRAVENOUS at 11:55

## 2019-06-21 RX ADMIN — MORPHINE SULFATE 4 MG: 4 INJECTION INTRAVENOUS at 14:46

## 2019-06-21 NOTE — PROGRESS NOTES
Bedside and Verbal shift change report given to Leeanne Jama (oncoming nurse) by Evelyn Quesada RN (offgoing nurse). Report included the following information SBAR, Kardex, Intake/Output and MAR.

## 2019-06-21 NOTE — ROUTINE PROCESS
TRANSFER - OUT REPORT: 
 
Verbal report given to Katharine Jauregui RN(name) on Maci Nash  being transferred to (unit) for routine progression of care Report consisted of patients Situation, Background, Assessment and  
Recommendations(SBAR). Information from the following report(s) SBAR, ED Summary, [facility] and Recent Results was reviewed with the receiving nurse. Lines:  
Peripheral IV 06/21/19 Left Antecubital (Active) Site Assessment Clean, dry, & intact 6/21/2019 11:40 AM  
Phlebitis Assessment 0 6/21/2019 11:40 AM  
Infiltration Assessment 0 6/21/2019 11:40 AM  
Dressing Status Clean, dry, & intact 6/21/2019 11:40 AM  
Dressing Type Transparent 6/21/2019 11:40 AM  
Hub Color/Line Status Patent; Flushed 6/21/2019 11:40 AM  
Action Taken Blood drawn 6/21/2019 11:40 AM  
Alcohol Cap Used Yes 6/21/2019 11:40 AM  
  
 
Opportunity for questions and clarification was provided. Patient transported with: 
 [employer]form

## 2019-06-21 NOTE — PROGRESS NOTES
Chart reviewed. Pt in ED awaiting observation admission and bed. Pt admitted for Partial small bowel obstruction, N/V. General surgery consult noted. No H&P available at this time. CM will be available thru hospital  this weekend for transition of care needs. Reason for Admission:   Partial small bowel obstruction                   RRAT Score:       low              Plan for utilizing home health:     TBD, provider if [facility] needed please place order                     Current Advanced Directive/Advance Care Plan: Not on file.  Provider please consider spiritual care consult to address ACP                         Transition of Care Plan:    MD follow up                 Care Management Interventions  Transition of Care Consult (CM Consult): Discharge Planning

## 2019-06-21 NOTE — H&P
History & Physical    Patient: Sterling Farr MRN: [id number]  CSN: [id number]    YOB: 1945  Age: 66 y.o. Sex: male      DOA: 6/21/2019  Primary Care Provider:  Danielle Jackson MD      Assessment/Plan     Patient Active Problem List   Diagnosis Code    Nausea & vomiting R11.2    Partial small bowel obstruction [facility]) K56.600       65 y/o male here with abdominal pain and radiographic findings suggestive of partial sbo. As an outpatient, on medications for essential hypertension. Feeling better already.      -Admit  -Hydrate.  -Symptom control with pain meds / antiemetics.  -Clear diet. -BP mgmt. -DVT px.  -Surgical consultation. Called by ER.  -Dispo 24-48 hrs. CC: abd pain         HPI:     Sterling Farr is a 66 y.o. male who presents wit habd pain x 24 hrs. Vomiting present. No fever. No diarrhea or blood in stool. IN had imaging that demonstrated partial bowel obstruction. Asked to admit for continuation of care. Past Medical History:   Diagnosis Date    Hypertension     Kidney stones        Past Surgical History:   Procedure Laterality Date    ABDOMEN SURGERY PROC UNLISTED      hernia repair at age 8    HX UROLOGICAL      lithotripsy       History reviewed. No pertinent family history. Social History     Socioeconomic History    Marital status:      Spouse name: Not on file    Number of children: Not on file    Years of education: Not on file    Highest education level: Not on file   Tobacco Use    Smoking status: Never Smoker    Smokeless tobacco: Never Used   Substance and Sexual Activity    Alcohol use: Yes     Comment: 2-3 drinks per week    Drug use: No       Prior to Admission medications    Medication Sig Start Date End Date Taking? Authorizing Provider   losartan (COZAAR) 50 mg tablet Take  by mouth daily. Yes Other, MD Jami   OTHER 1 Tab. \"statin\"   Yes Provider, Historical   VERAPAMIL HCL (VERAPAMIL PO) Take  by mouth.    Yes Marcial, MD Jami Omega-3 Fatty Acids 60- mg cpDR Take  by mouth daily. Provider, Historical   aspirin 81 mg chewable tablet Take 81 mg by mouth daily. Provider, Historical   LISINOPRIL PO Take  by mouth daily. Other, MD Jami       No Known Allergies    Review of Systems  Gen: No fever, chills, malaise, weight loss/gain. Heent: No headache, rhinorrhea, epistaxis, ear pain, hearing loss, sinus pain, neck pain/stiffness, sore throat. Heart: No chest pain, palpitations, CHISHOLM, pnd, or orthopnea. Resp: No cough, hemoptysis, wheezing and shortness of breath. GI: see hpi   : No urinary obstruction, dysuria or hematuria. Derm: No rash, new skin lesion or pruritis. Musc/skeletal: no bone or joint complains. Vasc: No edema, cyanosis or claudication. Endo: No heat/cold intolerance, no polyuria,polydipsia or polyphagia. Neuro: No unilateral weakness, numbness, tingling. No seizures. Heme: No easy bruising or bleeding. Physical Exam:     Physical Exam:  Visit Vitals  /86 (BP 1 Location: Right arm, BP Patient Position: At rest)   Pulse 89   Temp 98.2 °F (36.8 °C)   Resp 15   Ht 6' 1\" (1.854 m)   Wt 104.3 kg (230 lb)   SpO2 95%   BMI 30.34 kg/m²      O2 Device: Room air    Temp (24hrs), Av.8 °F (36.6 °C), Min:97.3 °F (36.3 °C), Max:98.2 °F (36.8 °C)     0701 -  1900  In: 1000 [I.V.:1000]  Out: -    No intake/output data recorded. General:  Awake, cooperative, no distress. Head:  Normocephalic, without obvious abnormality, atraumatic. Eyes:  Conjunctivae/corneas clear, sclera anicteric, PERRL, EOMs intact. Nose: Nares normal. No drainage or sinus tenderness. Throat: Lips, mucosa, and tongue normal.    Neck: Supple, symmetrical, trachea midline, no adenopathy. Lungs:   Clear to auscultation bilaterally. Heart:  Regular rate and rhythm, S1, S2 normal, no murmur, click, rub or gallop. Abdomen: Soft, non-tender. Bowel sounds normal. No masses,  No organomegaly. Labs Reviewed: All lab results for the last 24 hours reviewed. Recent Results (from the past 24 hour(s))   URINALYSIS W/ RFLX MICROSCOPIC    Collection Time: 06/21/19 11:25 AM   Result Value Ref Range    Color YELLOW      Appearance CLEAR      Specific gravity 1.019 1.005 - 1.030      pH (UA) 5.5 5.0 - 8.0      Protein 30 (A) NEG mg/dL    Glucose NEGATIVE  NEG mg/dL    Ketone NEGATIVE  NEG mg/dL    Bilirubin NEGATIVE  NEG      Blood NEGATIVE  NEG      Urobilinogen 1.0 0.2 - 1.0 EU/dL    Nitrites NEGATIVE  NEG      Leukocyte Esterase NEGATIVE  NEG     URINE MICROSCOPIC ONLY    Collection Time: 06/21/19 11:25 AM   Result Value Ref Range    WBC 0 to 3 0 - 5 /hpf    RBC 0 to 2 0 - 5 /hpf    Epithelial cells FEW 0 - 5 /lpf    Bacteria 1+ (A) NEG /hpf    Mucus 1+ (A) NEG /lpf   CBC WITH AUTOMATED DIFF    Collection Time: 06/21/19 11:40 AM   Result Value Ref Range    WBC 13.2 4.6 - 13.2 K/uL    RBC 5.40 4.70 - 5.50 M/uL    HGB 17.3 (H) 13.0 - 16.0 g/dL    HCT 50.0 (H) 36.0 - 48.0 %    MCV 92.6 74.0 - 97.0 FL    MCH 32.0 24.0 - 34.0 PG    MCHC 34.6 31.0 - 37.0 g/dL    RDW 12.3 11.6 - 14.5 %    PLATELET 410 496 - 126 K/uL    MPV 11.0 9.2 - 11.8 FL    NEUTROPHILS 82 (H) 40 - 73 %    LYMPHOCYTES 5 (L) 21 - 52 %    MONOCYTES 13 (H) 3 - 10 %    EOSINOPHILS 0 0 - 5 %    BASOPHILS 0 0 - 2 %    ABS. NEUTROPHILS 10.8 (H) 1.8 - 8.0 K/UL    ABS. LYMPHOCYTES 0.7 (L) 0.9 - 3.6 K/UL    ABS. MONOCYTES 1.7 (H) 0.05 - 1.2 K/UL    ABS. EOSINOPHILS 0.0 0.0 - 0.4 K/UL    ABS.  BASOPHILS 0.0 0.0 - 0.1 K/UL    DF AUTOMATED     METABOLIC PANEL, COMPREHENSIVE    Collection Time: 06/21/19 11:40 AM   Result Value Ref Range    Sodium 140 136 - 145 mmol/L    Potassium 4.2 3.5 - 5.5 mmol/L    Chloride 104 100 - 108 mmol/L    CO2 29 21 - 32 mmol/L    Anion gap 7 3.0 - 18 mmol/L    Glucose 144 (H) 74 - 99 mg/dL    BUN 18 7.0 - 18 MG/DL    Creatinine 1.34 (H) 0.6 - 1.3 MG/DL    BUN/Creatinine ratio 13 12 - 20      GFR est AA >60 >60 ml/min/1.73m2 GFR est non-AA 52 (L) >60 ml/min/1.73m2    Calcium 9.9 8.5 - 10.1 MG/DL    Bilirubin, total 0.8 0.2 - 1.0 MG/DL    ALT (SGPT) 35 16 - 61 U/L    AST (SGOT) 18 15 - 37 U/L    Alk. phosphatase 66 45 - 117 U/L    Protein, total 7.8 6.4 - 8.2 g/dL    Albumin 4.3 3.4 - 5.0 g/dL    Globulin 3.5 2.0 - 4.0 g/dL    A-G Ratio 1.2 0.8 - 1.7     LIPASE    Collection Time: 06/21/19 11:40 AM   Result Value Ref Range    Lipase 647 (H) 73 - 393 U/L   CARDIAC PANEL,(CK, CKMB & TROPONIN)    Collection Time: 06/21/19 11:40 AM   Result Value Ref Range     39 - 308 U/L    CK - MB 3.1 <3.6 ng/ml    CK-MB Index 2.7 0.0 - 4.0 %    Troponin-I, QT <0.02 0.0 - 0.045 NG/ML   EKG, 12 LEAD, INITIAL    Collection Time: 06/21/19 11:52 AM   Result Value Ref Range    Ventricular Rate 78 BPM    Atrial Rate 78 BPM    P-R Interval 310 ms    QRS Duration 96 ms    Q-T Interval 382 ms    QTC Calculation (Bezet) 435 ms    Calculated P Axis 56 degrees    Calculated R Axis -45 degrees    Calculated T Axis 55 degrees    Diagnosis       Sinus rhythm with 1st degree AV block  Left axis deviation  Moderate voltage criteria for LVH, may be normal variant  Inferior infarct , age undetermined  Anterior infarct (cited on or before 21-JUN-2019)  Abnormal ECG  When compared with ECG of 28-SEP-2015 17:51,  Inferior infarct is now present  Confirmed by Minna Steele MD, -- ([id number]) on 6/21/2019 2:22:31 PM         Procedures/imaging: see electronic medical records for all procedures/Xrays and details which were not copied into this note but were reviewed prior to creation of Plan    70 minutes of critical care time spent in the direct evaluation and treatment of this high risk patient. The reason for providing this level of medical care for this critically ill patient was due a critical illness that impaired one or more vital organ systems such that there was a high probability of imminent or life threatening deterioration in the patients condition.  This care involved high complexity decision making to assess, manipulate, and support vital system functions, to treat this degreee vital organ system failure and to prevent further life threatening deterioration of the patients condition.       CC: Tila Pineda MD

## 2019-06-21 NOTE — ED PROVIDER NOTES
EMERGENCY DEPARTMENT HISTORY AND PHYSICAL EXAM    Date: 6/21/2019  Patient Name: Carmelita Espinal    History of Presenting Illness     Chief Complaint   Patient presents with    Flank Pain         History Provided By: Patient    Chief Complaint: abdominal pain    HPI(Context):   11:27 AM  Carmelita Espinal is a 66 y.o. male with PMHX of stones, HTN, renal cysts who presents to the emergency department with wife C/O abdominal pain. Sxs started last night. Reports left flank pain and diffuse abdominal pain with abdominal distention. Associated sxs include nausea, vomiting, urinary frequency. Pt denies fever, chills, dysuria, hematuria, diarrhea, constipation, and any other sxs or complaints. Pt has hx of stones but unsure if this is same. Pt drinks one 12 oz beer or one glass of wine 5 days a week. No tobacco or illicit drug use. PCP: Bella Doty MD    Current Facility-Administered Medications   Medication Dose Route Frequency Provider Last Rate Last Dose    sodium chloride 0.9 % bolus infusion 1,000 mL  1,000 mL IntraVENous ONCE Lena Muñoz PA-C 1,000 mL/hr at 06/21/19 1446 1,000 mL at 06/21/19 1446     Current Outpatient Medications   Medication Sig Dispense Refill    losartan (COZAAR) 50 mg tablet Take  by mouth daily.  OTHER 1 Tab. \"statin\"      VERAPAMIL HCL (VERAPAMIL PO) Take  by mouth.  Omega-3 Fatty Acids 60- mg cpDR Take  by mouth daily.  aspirin 81 mg chewable tablet Take 81 mg by mouth daily.  LISINOPRIL PO Take  by mouth daily. Past History     Past Medical History:  Past Medical History:   Diagnosis Date    Hypertension     Kidney stones        Past Surgical History:  Past Surgical History:   Procedure Laterality Date    ABDOMEN SURGERY PROC UNLISTED      hernia repair at age 8    HX UROLOGICAL      lithotripsy       Family History:  History reviewed. No pertinent family history.     Social History:  Social History     Tobacco Use    Smoking status: Never Smoker    Smokeless tobacco: Never Used   Substance Use Topics    Alcohol use: Yes     Comment: 2-3 drinks per week    Drug use: No       Allergies:  No Known Allergies      Review of Systems   Review of Systems   Constitutional: Negative for chills and fever. Gastrointestinal: Positive for abdominal distention, abdominal pain, nausea and vomiting. Negative for diarrhea. Genitourinary: Positive for flank pain (left flank) and frequency. Negative for dysuria and hematuria. Musculoskeletal: Positive for back pain. Neurological: Positive for headaches. All other systems reviewed and are negative. Physical Exam     Vitals:    06/21/19 1118 06/21/19 1119 06/21/19 1450   BP: 101/73  142/86   Pulse: 69  89   Resp: 18  15   Temp: 97.3 °F (36.3 °C)  98.2 °F (36.8 °C)   SpO2: 94%  95%   Weight:  104.3 kg (230 lb)    Height:  6' 1\" (1.854 m)      Physical Exam   Constitutional: He is oriented to person, place, and time. He appears well-developed and well-nourished. No distress.  geriatric male that appears uncomfortable. Alert. Wife at bedside. HENT:   Head: Normocephalic and atraumatic. Right Ear: External ear normal.   Left Ear: External ear normal.   Eyes: Conjunctivae are normal. No scleral icterus. Neck: Normal range of motion. Cardiovascular: Normal rate, regular rhythm, normal heart sounds and intact distal pulses. Exam reveals no gallop and no friction rub. No murmur heard. Pulmonary/Chest: Effort normal and breath sounds normal. No accessory muscle usage. No tachypnea. No respiratory distress. He has no decreased breath sounds. He has no wheezes. He has no rhonchi. He has no rales. Abdominal: Soft. Normal appearance and bowel sounds are normal. He exhibits no distension and no ascites. There is tenderness in the epigastric area and periumbilical area. There is guarding. There is no rebound and no CVA tenderness. Musculoskeletal: Normal range of motion.    Lymphadenopathy: He has no cervical adenopathy. Neurological: He is alert and oriented to person, place, and time. Skin: Skin is warm and dry. He is not diaphoretic. Psychiatric: He has a normal mood and affect. Judgment normal.   Nursing note and vitals reviewed. Diagnostic Study Results     Labs -     Recent Results (from the past 12 hour(s))   URINALYSIS W/ RFLX MICROSCOPIC    Collection Time: 06/21/19 11:25 AM   Result Value Ref Range    Color YELLOW      Appearance CLEAR      Specific gravity 1.019 1.005 - 1.030      pH (UA) 5.5 5.0 - 8.0      Protein 30 (A) NEG mg/dL    Glucose NEGATIVE  NEG mg/dL    Ketone NEGATIVE  NEG mg/dL    Bilirubin NEGATIVE  NEG      Blood NEGATIVE  NEG      Urobilinogen 1.0 0.2 - 1.0 EU/dL    Nitrites NEGATIVE  NEG      Leukocyte Esterase NEGATIVE  NEG     URINE MICROSCOPIC ONLY    Collection Time: 06/21/19 11:25 AM   Result Value Ref Range    WBC 0 to 3 0 - 5 /hpf    RBC 0 to 2 0 - 5 /hpf    Epithelial cells FEW 0 - 5 /lpf    Bacteria 1+ (A) NEG /hpf    Mucus 1+ (A) NEG /lpf   CBC WITH AUTOMATED DIFF    Collection Time: 06/21/19 11:40 AM   Result Value Ref Range    WBC 13.2 4.6 - 13.2 K/uL    RBC 5.40 4.70 - 5.50 M/uL    HGB 17.3 (H) 13.0 - 16.0 g/dL    HCT 50.0 (H) 36.0 - 48.0 %    MCV 92.6 74.0 - 97.0 FL    MCH 32.0 24.0 - 34.0 PG    MCHC 34.6 31.0 - 37.0 g/dL    RDW 12.3 11.6 - 14.5 %    PLATELET 919 205 - 176 K/uL    MPV 11.0 9.2 - 11.8 FL    NEUTROPHILS 82 (H) 40 - 73 %    LYMPHOCYTES 5 (L) 21 - 52 %    MONOCYTES 13 (H) 3 - 10 %    EOSINOPHILS 0 0 - 5 %    BASOPHILS 0 0 - 2 %    ABS. NEUTROPHILS 10.8 (H) 1.8 - 8.0 K/UL    ABS. LYMPHOCYTES 0.7 (L) 0.9 - 3.6 K/UL    ABS. MONOCYTES 1.7 (H) 0.05 - 1.2 K/UL    ABS. EOSINOPHILS 0.0 0.0 - 0.4 K/UL    ABS.  BASOPHILS 0.0 0.0 - 0.1 K/UL    DF AUTOMATED     METABOLIC PANEL, COMPREHENSIVE    Collection Time: 06/21/19 11:40 AM   Result Value Ref Range    Sodium 140 136 - 145 mmol/L    Potassium 4.2 3.5 - 5.5 mmol/L    Chloride 104 100 - 108 mmol/L    CO2 29 21 - 32 mmol/L    Anion gap 7 3.0 - 18 mmol/L    Glucose 144 (H) 74 - 99 mg/dL    BUN 18 7.0 - 18 MG/DL    Creatinine 1.34 (H) 0.6 - 1.3 MG/DL    BUN/Creatinine ratio 13 12 - 20      GFR est AA >60 >60 ml/min/1.73m2    GFR est non-AA 52 (L) >60 ml/min/1.73m2    Calcium 9.9 8.5 - 10.1 MG/DL    Bilirubin, total 0.8 0.2 - 1.0 MG/DL    ALT (SGPT) 35 16 - 61 U/L    AST (SGOT) 18 15 - 37 U/L    Alk. phosphatase 66 45 - 117 U/L    Protein, total 7.8 6.4 - 8.2 g/dL    Albumin 4.3 3.4 - 5.0 g/dL    Globulin 3.5 2.0 - 4.0 g/dL    A-G Ratio 1.2 0.8 - 1.7     LIPASE    Collection Time: 06/21/19 11:40 AM   Result Value Ref Range    Lipase 647 (H) 73 - 393 U/L   CARDIAC PANEL,(CK, CKMB & TROPONIN)    Collection Time: 06/21/19 11:40 AM   Result Value Ref Range     39 - 308 U/L    CK - MB 3.1 <3.6 ng/ml    CK-MB Index 2.7 0.0 - 4.0 %    Troponin-I, QT <0.02 0.0 - 0.045 NG/ML   EKG, 12 LEAD, INITIAL    Collection Time: 06/21/19 11:52 AM   Result Value Ref Range    Ventricular Rate 78 BPM    Atrial Rate 78 BPM    P-R Interval 310 ms    QRS Duration 96 ms    Q-T Interval 382 ms    QTC Calculation (Bezet) 435 ms    Calculated P Axis 56 degrees    Calculated R Axis -45 degrees    Calculated T Axis 55 degrees    Diagnosis       Sinus rhythm with 1st degree AV block  Left axis deviation  Moderate voltage criteria for LVH, may be normal variant  Inferior infarct , age undetermined  Anterior infarct (cited on or before 21-JUN-2019)  Abnormal ECG  When compared with ECG of 28-SEP-2015 17:51,  Inferior infarct is now present  Confirmed by Stas Lewis MD, -- ([id number]) on 6/21/2019 2:22:31 PM             CT ABD PELV W CONT   Final Result   IMPRESSION:      Dilated small bowel loops which transitional zone in the right midabdomen most   likely partial small bowel obstruction. Occasional colonic diverticula seen without evidence of acute diverticulitis or   obstruction. Distended stomach filled with fluid.    Other chronic changes as described above. CT Results  (Last 48 hours)               06/21/19 1343  CT ABD PELV W CONT Final result    Impression:  IMPRESSION:       Dilated small bowel loops which transitional zone in the right midabdomen most   likely partial small bowel obstruction. Occasional colonic diverticula seen without evidence of acute diverticulitis or   obstruction. Distended stomach filled with fluid. Other chronic changes as described above. Narrative:  EXAM: CT ABDOMEN AND PELVIS:       INDICATION: Abdominal pain including left flank       COMPARISON: Noncontrast study dated 7/26/2018. TECHNIQUE: Axial CT imaging of the abdomen and pelvis was performed after   intravenous contrast administration only. Multiplanar reformats were generated. One or more dose reduction techniques were used on this CT: automated exposure   control, adjustment of the mAs and/or kVp according to patient's size, and   iterative reconstruction techniques. The specific techniques utilized on this CT   exam have been documented in the patient's electronic medical record. Digital Imaging and Communications in Medicine (DICOM) format image data are   available to nonaffiliated external healthcare facilities or entities on a   secure, media free, reciprocally searchable basis with patient authorization for   at least a 12-month period after this study. _______________       FINDINGS:       LOWER CHEST: Mild atelectasis seen along the dependent portions bilaterally. LIVER, BILIARY: Multiple low-density lesions are seen most likely cysts although   some are too small for adequate CT densitometry. There is mild diffuse hepatic   steatosis. No significant biliary dilation. Gallbladder is unremarkable. PANCREAS: Normal.       SPLEEN: Normal.       ADRENALS: Normal.       KIDNEYS/URETERS/BLADDER: There is no hydronephrosis on either side.  Multiple   variable sized cysts are seen throughout both kidneys some of which are too   small for adequate CT densitometry. No evidence of any bladder stone or wall   thickening is identified. PELVIC ORGANS: Prostate gland slightly enlarged. VASCULATURE: Mild to moderate atherosclerotic calcifications without aneurysm. LYMPH NODES: No enlarged lymph nodes. GASTROINTESTINAL TRACT: Stomach is distended. Small bowel dilatation noted   throughout the proximal and mid ileum likely with a transitional zone in the   right midabdomen image 80. Occasional colonic diverticula noted without evidence of acute diverticulitis or   obstruction. The appendix appears normal.       BONES: Degenerative changes are seen throughout the spine. OTHER: Bilateral inguinal hernias containing only fat.       _______________               CXR Results  (Last 48 hours)    None          Medications given in the ED-  Medications   sodium chloride 0.9 % bolus infusion 1,000 mL (1,000 mL IntraVENous New Bag 6/21/19 1446)   sodium chloride 0.9 % bolus infusion 1,000 mL (0 mL IntraVENous IV Completed 6/21/19 1400)   ondansetron (ZOFRAN) injection 4 mg (4 mg IntraVENous Given 6/21/19 1154)   dicyclomine (BENTYL) 10 mg/mL injection 20 mg (20 mg IntraMUSCular Given 6/21/19 1155)   pantoprazole (PROTONIX) injection 40 mg (40 mg IntraVENous Given 6/21/19 1154)   fentaNYL citrate (PF) injection 50 mcg (50 mcg IntraVENous Given 6/21/19 1155)   iopamidol (ISOVUE 300) 61 % contrast injection 100 mL (100 mL IntraVENous Given 6/21/19 1329)   morphine injection 4 mg (4 mg IntraVENous Given 6/21/19 1446)   ondansetron (ZOFRAN) injection 4 mg (4 mg IntraVENous Given 6/21/19 1446)         Medical Decision Making   I am the first provider for this patient. I reviewed the vital signs, available nursing notes, past medical history, past surgical history, family history and social history. Vital Signs-Reviewed the patient's vital signs.     Pulse Oximetry Analysis - 94% on RA Records Reviewed: Nursing Notes and Old Medical Records    Provider Notes (Medical Decision Making): stone, UTI/pyelo, gastritis, PUD, GERD, appy, colitis, diverticulitis, gastroenteritis, pancreatitis, hepatitis, GB. Consider cardiac. Doubt SBO or dissection    Procedures:  Procedures    ED Course:   11:27 AM Initial assessment performed. The patients presenting problems have been discussed, and they are in agreement with the care plan formulated and outlined with them. I have encouraged them to ask questions as they arise throughout their visit. Diagnosis and Disposition       2:36 PM Consult: I discussed care with Carmine Kern MD (general surgery). It was a standard discussion, including history of patients chief complaint, available diagnostic results, and treatment course. Can admit for observation overnight to medicine and she will consult on patient. 2:37 PM  Pt is hesitant to come into hospital. He would like to discuss with his friend who is a retired surgeon about admission. 2:59 PM Consult: I discussed care with Angel Lorenzo MD It was a standard discussion, including history of patients chief complaint, available diagnostic results, and treatment course. Agrees to admit for observation        1. Partial small bowel obstruction ([address]    2. Nausea and vomiting, intractability of vomiting not specified, unspecified vomiting type        PLAN:  1. ADMIT for observation    Please note that this dictation was completed with Vusion, the computer voice recognition software. Quite often unanticipated grammatical, syntax, homophones, and other interpretive errors are inadvertently transcribed by the computer software. Please disregard these errors. Please excuse any errors that have escaped final proofreading.

## 2019-06-22 PROBLEM — I10 HYPERTENSION: Status: ACTIVE | Noted: 2019-06-22

## 2019-06-22 PROBLEM — K56.609 SBO (SMALL BOWEL OBSTRUCTION) (HCC): Status: ACTIVE | Noted: 2019-06-22

## 2019-06-22 PROCEDURE — 74011250636 HC RX REV CODE- 250/636: Performed by: FAMILY MEDICINE

## 2019-06-22 PROCEDURE — C9113 INJ PANTOPRAZOLE SODIUM, VIA: HCPCS | Performed by: FAMILY MEDICINE

## 2019-06-22 PROCEDURE — 99218 HC RM OBSERVATION: CPT

## 2019-06-22 PROCEDURE — 74011250636 HC RX REV CODE- 250/636: Performed by: HOSPITALIST

## 2019-06-22 PROCEDURE — 65270000029 HC RM PRIVATE

## 2019-06-22 PROCEDURE — 74011250636 HC RX REV CODE- 250/636: Performed by: INTERNAL MEDICINE

## 2019-06-22 RX ORDER — METOCLOPRAMIDE HYDROCHLORIDE 5 MG/ML
5 INJECTION INTRAMUSCULAR; INTRAVENOUS EVERY 6 HOURS
Status: DISCONTINUED | OUTPATIENT
Start: 2019-06-22 | End: 2019-06-23 | Stop reason: HOSPADM

## 2019-06-22 RX ORDER — HYDRALAZINE HYDROCHLORIDE 20 MG/ML
20 INJECTION INTRAMUSCULAR; INTRAVENOUS
Status: DISCONTINUED | OUTPATIENT
Start: 2019-06-22 | End: 2019-06-23 | Stop reason: HOSPADM

## 2019-06-22 RX ADMIN — SODIUM CHLORIDE 125 ML/HR: 900 INJECTION, SOLUTION INTRAVENOUS at 07:39

## 2019-06-22 RX ADMIN — HEPARIN SODIUM 5000 UNITS: 5000 INJECTION INTRAVENOUS; SUBCUTANEOUS at 23:29

## 2019-06-22 RX ADMIN — PANTOPRAZOLE SODIUM 40 MG: 40 INJECTION, POWDER, FOR SOLUTION INTRAVENOUS at 23:29

## 2019-06-22 RX ADMIN — PANTOPRAZOLE SODIUM 40 MG: 40 INJECTION, POWDER, FOR SOLUTION INTRAVENOUS at 12:11

## 2019-06-22 RX ADMIN — HEPARIN SODIUM 5000 UNITS: 5000 INJECTION INTRAVENOUS; SUBCUTANEOUS at 07:57

## 2019-06-22 RX ADMIN — METOCLOPRAMIDE 5 MG: 5 INJECTION, SOLUTION INTRAMUSCULAR; INTRAVENOUS at 18:29

## 2019-06-22 RX ADMIN — HEPARIN SODIUM 5000 UNITS: 5000 INJECTION INTRAVENOUS; SUBCUTANEOUS at 16:48

## 2019-06-22 RX ADMIN — MORPHINE SULFATE 2 MG: 2 INJECTION, SOLUTION INTRAMUSCULAR; INTRAVENOUS at 07:57

## 2019-06-22 RX ADMIN — SODIUM CHLORIDE 125 ML/HR: 900 INJECTION, SOLUTION INTRAVENOUS at 16:49

## 2019-06-22 RX ADMIN — METOCLOPRAMIDE 5 MG: 5 INJECTION, SOLUTION INTRAMUSCULAR; INTRAVENOUS at 12:11

## 2019-06-22 RX ADMIN — MORPHINE SULFATE 2 MG: 2 INJECTION, SOLUTION INTRAMUSCULAR; INTRAVENOUS at 01:50

## 2019-06-22 RX ADMIN — METOCLOPRAMIDE 5 MG: 5 INJECTION, SOLUTION INTRAMUSCULAR; INTRAVENOUS at 23:29

## 2019-06-22 NOTE — PROGRESS NOTES
Hospitalist Progress Note    Patient: Sterling Farr MRN: [id number]  CSN: [id number]    YOB: 1945  Age: 66 y.o.   Sex: male    DOA: 6/21/2019 LOS:  LOS: 0 days          Chief Complaint:    Partial SBO      Assessment/Plan     Partial SBO  HTN  Obesity    Declined NG tube this am despite continued drainage  Insisted it be removed due to discomfort-we reviewed risks of needing it reinserted if bowel remians obstructed-which is elevated risk, he and daughter understood    Started reglan IV  PRN hydralazine if BP climbs      Surgery consulted and reviewed with him risks of bowel not opening and possible surgery needed if he doesn't improve    Continue IV PPI, and IVF    Monitor clinical course  NPO  Disposition :  Patient Active Problem List   Diagnosis Code    Nausea & vomiting R11.2    Partial small bowel obstruction ([address] K56.600    SBO (small bowel obstruction) ([facility] [address] K56.609    Hypertension I10       Subjective:  Feels his bowel issue is due to eating old brown dried pineapple-\"10years old\"  Feels he does not need NG tube, states drainage done, but nurse notes that he has continued draiange coming from NG-had over a liter output since insertion    He still has abd distention, no flatulence and mild nausea      Review of systems:    Constitutional: denies fevers, chills  Respiratory: denies SOB  Cardiovascular: denies chest pain  Gastrointestinal: denies vomiting, diarrhea      Vital signs/Intake and Output:  Visit Vitals  /54 (BP 1 Location: Right arm, BP Patient Position: At rest)   Pulse 65   Temp 98.6 °F (37 °C)   Resp 17   Ht 6' 1\" (1.854 m)   Wt 108.2 kg (238 lb 8 oz)   SpO2 96%   BMI 31.47 kg/m²     Current Shift:  06/22 0701 - 06/22 1900  In: -   Out: 350 [Urine:150]  Last three shifts:  06/20 1901 - 06/22 0700  In: 1952.1 [I.V.:1952.1]  Out: 1000     Exam:    General: Well developed, alert, NAD, OX3  CVS:Regular rate and rhythm, no M/R/G, S1/S2 heard, no thrill  Lungs:Clear to auscultation bilaterally, no wheezes, rhonchi, or rales  Abdomen: soft, mild distention, few normal sounding BS, NT  Extremities: No C/C/E, pulses palpable 2+  Neuro:grossly normal , follows commands  Psych:appropriate                Labs: Results:       Chemistry Recent Labs     06/21/19  1140   *      K 4.2      CO2 29   BUN 18   CREA 1.34*   CA 9.9   AGAP 7   BUCR 13   AP 66   TP 7.8   ALB 4.3   GLOB 3.5   AGRAT 1.2      CBC w/Diff Recent Labs     06/21/19  1140   WBC 13.2   RBC 5.40   HGB 17.3*   HCT 50.0*      GRANS 82*   LYMPH 5*   EOS 0      Cardiac Enzymes Recent Labs     06/21/19  1140      CKND1 2.7      Coagulation No results for input(s): PTP, INR, APTT in the last 72 hours. No lab exists for component: INREXT    Lipid Panel No results found for: CHOL, CHOLPOCT, CHOLX, CHLST, CHOLV, 854823, HDL, LDL, LDLC, DLDLP, 986977, VLDLC, VLDL, TGLX, TRIGL, TRIGP, TGLPOCT, CHHD, CHHDX   BNP No results for input(s): BNPP in the last 72 hours.    Liver Enzymes Recent Labs     06/21/19  1140   TP 7.8   ALB 4.3   AP 66   SGOT 18      Thyroid Studies No results found for: T4, T3U, TSH, TSHEXT     Procedures/imaging: see electronic medical records for all procedures/Xrays and details which were not copied into this note but were reviewed prior to creation of Tonie Grant MD

## 2019-06-22 NOTE — PROGRESS NOTES
2215: Patient requesting MD to come speak with him, patient states that he is not passing gas and is burping a lot. Patient request GI Cocktail and Pepcid until MD come speak with him. Spoke with Dr. Florinda Conteh MD to see patient as soon as possible. 2345: 16Fr NGT placed to right nare. Patient tolerated well. Radiology notified to come obtain KUB. 0015: Patient anxious. Patient was under the impression that NGT was going to be an in and out process. Explained to patient that tube will remain in until MD orders for NGT to be removed. [id number] Bunting on unit to obtain KUB.    0123: Patient upset stating \"it's been almost three hours and I'm still waiting. \" Informed patient that NGT was placed less than two hours ago and I cannot proceed until xray is read by MD. Reassured patient that as soon as the xray is resulted, I will notify the MD and will keep him updated when I get results of reading and how to proceed. 0147: Xray resulted, Gastric tube in good position. Magdiel Bailon Spoke with Dr. Florinda Conteh to update on results. Per MD, okay to connect to low, intermittent wall suction.

## 2019-06-22 NOTE — CONSULTS
Surgery Consult    Subjective:      Mtat Varela is a 66 y.o. male with PMHX of stones, HTN, renal cysts who presents to the emergency department with wife C/O abdominal pain. Sxs started last night. Reports left flank pain and diffuse abdominal pain with abdominal distention. Associated sxs include nausea, vomiting, urinary frequency. Pt denies fever, chills, dysuria, hematuria, diarrhea, constipation, and any other sxs or complaints. Pt has hx of stones but unsure if this is same. Pt drinks one 12 oz beer or one glass of wine 5 days a week. No tobacco or illicit drug use. Report years of \"abdominal issues\", had left inguinal hernia surgery at 5, no other abdominal surgeries. Reported amebosis infection in [place] which was \"terrible\". No other risk factors. Currently comfortable. Had NGT overnight for profuse vomiting with almost 2L dark green output, but frustrated with communication and NGT not sticking so pt pulled the NGT out. No N/V/flatus or BM currently but had loose stool yesterday morning. Patient Active Problem List    Diagnosis Date Noted    Nausea & vomiting 06/21/2019    Partial small bowel obstruction ([address] 06/21/2019     Past Medical History:   Diagnosis Date    Hypertension     Kidney stones       Past Surgical History:   Procedure Laterality Date    ABDOMEN SURGERY PROC UNLISTED      hernia repair at age 7   [facility] UROLOGICAL      lithotripsy      Social History     Tobacco Use    Smoking status: Never Smoker    Smokeless tobacco: Never Used   Substance Use Topics    Alcohol use: Yes     Comment: 2-3 drinks per week      History reviewed. No pertinent family history.    Current Facility-Administered Medications   Medication Dose Route Frequency    metoclopramide HCl (REGLAN) injection 5 mg  5 mg IntraVENous Q6H    hydrALAZINE (APRESOLINE) 20 mg/mL injection 20 mg  20 mg IntraVENous Q6H PRN    heparin (porcine) injection 5,000 Units  5,000 Units SubCUTAneous Q8H    ondansetron [facility]) injection 4 mg  4 mg IntraVENous Q6H PRN    morphine injection 2 mg  2 mg IntraVENous Q3H PRN    pantoprazole (PROTONIX) 40 mg in sodium chloride 0.9% 10 mL injection  40 mg IntraVENous Q12H    0.9% sodium chloride infusion  125 mL/hr IntraVENous CONTINUOUS      No Known Allergies    Review of Systems:    A comprehensive review of systems was negative except for that written in the History of Present Illness. Objective:        Visit Vitals  /67 (BP 1 Location: Left leg, BP Patient Position: At rest)   Pulse (!) 58   Temp 98 °F (36.7 °C)   Resp 18   Ht 6' 1\" (1.854 m)   Wt 108.2 kg (238 lb 8 oz)   SpO2 93%   BMI 31.47 kg/m²       Physical Exam: non-toxic comfortable  General:  Alert, cooperative, no distress, appears stated age. Eyes:  Conjunctivae/corneas clear. PERRL, EOMs intact   Ears:  external ear canals both ears. Nose: Nares normal. Septum midline. Mouth/Throat: Lips, mucosa, and tongue normal.    Neck: Supple, symmetrical, trachea midline, and no JVD. Back:   Symmetric, no curvature. ROM normal. No CVA tenderness. Lungs:   Clear to auscultation bilaterally. Heart:  Regular rate and rhythm   Abdomen:   Soft, non-tender. Distended, Bowel sounds normal. No masses,  No organomegaly. Possible left inguinal/incisional recurrent hernia without bowel   Extremities: Extremities normal, atraumatic, no cyanosis or edema. Pulses: 2+ and symmetric all extremities. Skin: Skin color, texture, turgor normal. No rashes or lesions       Neurologic: CNII-XII intact.  Normal strength       Imaging:  images and reports reviewed and reviewed  CT, Consultants documentation, Nursing documentation and I & O    Lab/Data Review:  BMP: No results found for: NA, K, CL, CO2, AGAP, GLU, BUN, CREA, GFRAA, GFRNA  CMP: No results found for: NA, K, CL, CO2, AGAP, GLU, BUN, CREA, GFRAA, GFRNA, CA, MG, PHOS, ALB, TBIL, TP, ALB, GLOB, AGRAT, SGOT, ALT, GPT  CBC: No results found for: WBC, HGB, HGBEXT, HCT, HCTEXT, PLT, PLTEXT, HGBEXT, HCTEXT, PLTEXT  Liver Panel: No results found for: ALB, CBIL, TBIL, TP, GLOB, AGRAT, SGOT, ASTPOC, ALTPOC, ALT, GPT, AP      Assessment:     Abdominal pain, suspect PSBO. PT removed NGT treatment. Spoke at length (60min) with pt discussing diagnosis, pathophysiology, treatment rational and algorithm as well as RBA of all. Detailed expectations of hospital/treatment course discussed to include recurrent re-admissions for decompression. If surgery required, discussed aspects of surgical intervention, methods, risks (including by not limited to infection, bleeding, hematoma, and perforation of the intestines or solid organs, injury to bowel causing fistula and need for ostomy or development of more adhesions and SBO), and the risks of general anesthetic. The patient understands the risks; any and all questions were answered to the patient's satisfaction. Plan:     1. I recommend proceeding with Conservative therapy:  Observation, Bowel rest and NGT decompression if pt does not return to bowel function and becomes uncomfortable. 2. Will continue to follow. Please call if condition worsens - will re-evaluate daily for progress and consideration for surgery.     Mitchell Deal,   6/22/2019  12:17 PM

## 2019-06-22 NOTE — PROGRESS NOTES
Received bedside report from night shift RN. Patient resting quietly in bed. Patient appears anxious because his NG tube is still in.     0945 paged Dr Don Nolan, patient wants NG tube out. Dr Don Nolan put in orders for Reglan, and told us to take out NG tube. And told patient to remain NPO for 4-5 hours. 1500 Dr Don Nolan put in orders for cardiac monitoring and transfer to telemetry. Upon clarifying these orders, she stated  to disregard, telemetry orders not needed. 1700 patient passing flatus since around noon, no BM yet. Patient starting back on clear liquid diet. Patient tolerating liquid diet well. Approximately 80% of lunch and 100% of dinner. Patient ambulating to restroom without assistance. Patient Vitals for the past 12 hrs:   Temp Pulse Resp BP SpO2   06/22/19 1523 99.4 °F (37.4 °C) 74 16 127/64 97 %   06/22/19 1204 98.6 °F (37 °C) 65 17 117/54 96 %   06/22/19 0826 98 °F (36.7 °C) (!) 58 18 127/67 93 %     Bedside and Verbal shift change report given to Katelyn Harrington RN (oncoming nurse) by Kashmir Marie RN (offgoing nurse). Report included the following information SBAR, Kardex, Intake/Output, MAR and Recent Results.

## 2019-06-22 NOTE — PROGRESS NOTES
RN reported that pt desired to discuss his treatment plan. He has had a distended and uncomfortable abdomen since last night. Of note, pt did have bowel sounds present in all quadrants. He was able to tolerate some clears this evening without vomiting. However, given that he has not been able to pass flatus and remains distended, offered NGT to help with decompression. Pt agreed. NGT placed and put on low intermittent suction. KUB confirmed NGT placement. Will start IVF as he will be NPO.  General surgery to see pt in the Manish Talley MD  Nocturnist

## 2019-06-22 NOTE — PROGRESS NOTES
Follow up visit by CM - patient has been upgraded to inpatient status so no OBS paperwork signed. No DME and no HH needs. CM available as needed.     Care Management Interventions  PCP Verified by CM: Yes(Dr. Dante Resendiz - three months ago)  Mode of Transport at Discharge: Self  Transition of Care Consult (CM Consult): Discharge Planning  Current Support Network: Own Home(lives with fiance)  Confirm Follow Up Transport: Other (see comment)(fiancee)  Plan discussed with Pt/Family/Caregiver: Yes  Discharge Location  Discharge Placement: Home

## 2019-06-23 VITALS
WEIGHT: 238.5 LBS | SYSTOLIC BLOOD PRESSURE: 127 MMHG | HEART RATE: 71 BPM | TEMPERATURE: 98.5 F | RESPIRATION RATE: 18 BRPM | DIASTOLIC BLOOD PRESSURE: 70 MMHG | HEIGHT: 73 IN | BODY MASS INDEX: 31.61 KG/M2 | OXYGEN SATURATION: 97 %

## 2019-06-23 LAB
ALBUMIN SERPL-MCNC: 3.1 G/DL (ref 3.4–5)
ALBUMIN/GLOB SERPL: 1.1 {RATIO} (ref 0.8–1.7)
ALP SERPL-CCNC: 46 U/L (ref 45–117)
ALT SERPL-CCNC: 36 U/L (ref 16–61)
ANION GAP SERPL CALC-SCNC: 6 MMOL/L (ref 3–18)
AST SERPL-CCNC: 24 U/L (ref 15–37)
BILIRUB SERPL-MCNC: 1.1 MG/DL (ref 0.2–1)
BUN SERPL-MCNC: 13 MG/DL (ref 7–18)
BUN/CREAT SERPL: 12 (ref 12–20)
CALCIUM SERPL-MCNC: 8.2 MG/DL (ref 8.5–10.1)
CHLORIDE SERPL-SCNC: 109 MMOL/L (ref 100–108)
CO2 SERPL-SCNC: 26 MMOL/L (ref 21–32)
CREAT SERPL-MCNC: 1.11 MG/DL (ref 0.6–1.3)
GLOBULIN SER CALC-MCNC: 2.8 G/DL (ref 2–4)
GLUCOSE SERPL-MCNC: 97 MG/DL (ref 74–99)
POTASSIUM SERPL-SCNC: 4.3 MMOL/L (ref 3.5–5.5)
PROT SERPL-MCNC: 5.9 G/DL (ref 6.4–8.2)
SODIUM SERPL-SCNC: 141 MMOL/L (ref 136–145)

## 2019-06-23 PROCEDURE — 74011250636 HC RX REV CODE- 250/636: Performed by: FAMILY MEDICINE

## 2019-06-23 PROCEDURE — 36415 COLL VENOUS BLD VENIPUNCTURE: CPT

## 2019-06-23 PROCEDURE — 80053 COMPREHEN METABOLIC PANEL: CPT

## 2019-06-23 PROCEDURE — 74011250636 HC RX REV CODE- 250/636: Performed by: HOSPITALIST

## 2019-06-23 RX ORDER — PANTOPRAZOLE SODIUM 40 MG/1
40 TABLET, DELAYED RELEASE ORAL DAILY
Qty: 30 TAB | Refills: 0 | Status: SHIPPED | OUTPATIENT
Start: 2019-06-23

## 2019-06-23 RX ADMIN — METOCLOPRAMIDE 5 MG: 5 INJECTION, SOLUTION INTRAMUSCULAR; INTRAVENOUS at 05:47

## 2019-06-23 RX ADMIN — SODIUM CHLORIDE 125 ML/HR: 900 INJECTION, SOLUTION INTRAVENOUS at 03:43

## 2019-06-23 NOTE — DISCHARGE INSTRUCTIONS
Patient Education        Bowel Blockage (Intestinal Obstruction): Care Instructions  Your Care Instructions  A bowel blockage, also called an intestinal obstruction, can prevent gas, fluids, or solids from moving through the intestines normally. It can cause constipation and, rarely, diarrhea. You may have pain, nausea, vomiting, and cramping. Most of the time, complete blockages require a stay in the hospital and possibly surgery. But if your bowel is only partly blocked, your doctor may tell you to wait until it clears on its own and you are able to pass gas and stool. If so, there are things you can do at home to help make you feel better. If you have had surgery for a bowel blockage, there are things you can do at home to make sure you heal well. You can also make some changes to keep your bowel from becoming blocked again. Follow-up care is a key part of your treatment and safety. Be sure to make and go to all appointments, and call your doctor if you are having problems. It's also a good idea to know your test results and keep a list of the medicines you take. How can you care for yourself at home? If your doctor has told you to wait at home for a blockage to clear on its own:  · Follow your doctor's instructions. These may include eating a liquid diet to avoid complete blockage. · Be safe with medicines. Take your medicines exactly as prescribed. Call your doctor if you think you are having a problem with your medicine. · Put a heating pad set on low on your belly to relieve mild cramps and pain. To prevent another blockage  · Try to eat smaller amounts of food more often. For example, have 5 or 6 small meals throughout the day instead of 2 or 3 large meals. · Chew your food very well. Try to chew each bite about 20 times or until it is liquid. · Avoid high-fiber foods and raw fruits and vegetables with skins, husks, strings, or seeds.  These can form a ball of undigested material that can cause a blockage if a part of your bowel is scarred or narrowed. · Check with your doctor before you eat whole-grain products or use a fiber supplement such as Citrucel or Metamucil. · To help you have regular bowel movements, eat at regular times, do not strain during a bowel movement, and drink at least 8 to 10 glasses of water each day. If you have kidney, heart, or liver disease and have to limit fluids, talk with your doctor or before you increase the amount of fluids you drink. · Drink high-calorie liquid formulas if your doctor says to. Severe symptoms may make it hard for your body to take in vitamins and minerals. · Get regular exercise. It helps you digest your food better. Get at least 30 minutes of physical activity on most days of the week. Walking is a good choice. When should you call for help? Call your doctor now or seek immediate medical care if:    · You have a fever.     · You are vomiting.     · You have new or worse belly pain.     · You cannot pass stools or gas.    Watch closely for changes in your health, and be sure to contact your doctor if you have any problems. Where can you learn more? Go to http://michael-nasra.info/. Enter F092 in the search box to learn more about \"Bowel Blockage (Intestinal Obstruction): Care Instructions. \"  Current as of: March 27, 2018  Content Version: 11.9  © 3770-4509 [employer]. Care instructions adapted under license by [employer] (which disclaims liability or warranty for this information). If you have questions about a medical condition or this instruction, always ask your healthcare professional. [place] [address] any warranty or liability for your use of this information. Patient Education        Nausea and Vomiting: Care Instructions  Your Care Instructions    When you are nauseated, you may feel weak and sweaty and notice a lot of saliva in your mouth. Nausea often leads to vomiting. Most of the time you do not need to worry about nausea and vomiting, but they can be signs of other illnesses. Two common causes of nausea and vomiting are stomach flu and food poisoning. Nausea and vomiting from viral stomach flu will usually start to improve within 24 hours. Nausea and vomiting from food poisoning may last from 12 to 48 hours. The doctor has checked you carefully, but problems can develop later. If you notice any problems or new symptoms, get medical treatment right away. Follow-up care is a key part of your treatment and safety. Be sure to make and go to all appointments, and call your doctor if you are having problems. It's also a good idea to know your test results and keep a list of the medicines you take. How can you care for yourself at home? · To prevent dehydration, drink plenty of fluids, enough so that your urine is light yellow or clear like water. Choose water and other caffeine-free clear liquids until you feel better. If you have kidney, heart, or liver disease and have to limit fluids, talk with your doctor before you increase the amount of fluids you drink. · Rest in bed until you feel better. · When you are able to eat, try clear soups, mild foods, and liquids until all symptoms are gone for 12 to 48 hours. Other good choices include dry toast, crackers, cooked cereal, and gelatin dessert, such as Jell-O. When should you call for help? Call 911 anytime you think you may need emergency care. For example, call if:    · You passed out (lost consciousness).    Call your doctor now or seek immediate medical care if:    · You have symptoms of dehydration, such as:  ? Dry eyes and a dry mouth. ? Passing only a little dark urine. ?  Feeling thirstier than usual.     · You have new or worsening belly pain.     · You have a new or higher fever.     · You vomit blood or what looks like coffee grounds.    Watch closely for changes in your health, and be sure to contact your doctor if:    · You have ongoing nausea and vomiting.     · Your vomiting is getting worse.     · Your vomiting lasts longer than 2 days.     · You are not getting better as expected. Where can you learn more? Go to http://michael-nasra.info/. Enter 25 248368 in the search box to learn more about \"Nausea and Vomiting: Care Instructions. \"  Current as of: September 23, 2018  Content Version: 11.9  © 7682-0116 [employer]. Care instructions adapted under license by [employer] (which disclaims liability or warranty for this information). If you have questions about a medical condition or this instruction, always ask your healthcare professional. [address] any warranty or liability for your use of this information.

## 2019-06-23 NOTE — PROGRESS NOTES
Received bedside report from night shift RN. Patient resting in bed quietly. Bowel sounds active, patient alert and oriented x 4. No complaint of pain/nausea. Patient had BM this AM.     Tolerating regular diet well. Patient Vitals for the past 12 hrs:   Temp Pulse Resp BP SpO2   06/23/19 0808 98.5 °F (36.9 °C) 71 18 127/70 97 %   06/23/19 0313 98.6 °F (37 °C) 70 16 108/69 97 %     I have reviewed discharge instructions with the patient. The patient verbalized understanding. Patient taken to main entrance via wheelchair, wife taking him home.

## 2019-06-24 NOTE — DISCHARGE SUMMARY
[facility] SUMMARY    Name:  Lin Oppenheim  MR#:   [id number]  :  1945  ACCOUNT #:  [de-identified]  ADMIT DATE:  2019  DISCHARGE DATE:  2019      DISCHARGE DIAGNOSIS:  Partial small-bowel obstruction. CONSULTANTS:  Stephen Way DO, Colorectal Surgery. HOSPITAL SUMMARY:  This is a 75-year-old white male with a history of kidney stones, high blood pressure, who came to the emergency room with his wife complaining of severe abdominal pain. It had started the night prior. He had associated nausea, vomiting, and urinary frequency but no fevers. He states he had eaten some very hard, dried pineapple that he found that was out of date but he almost ate the whole bag of it and he became acutely ill, he thinks after having that. He has had a previous left inguinal hernia repair. He otherwise has had no bowel issues or surgeries, otherwise, he is not a diabetic. He takes blood pressure medications. He is a nonsmoker. He drinks two to three alcoholic beverages per week. He was admitted for IV fluids, conservative therapy initially, but worsened intermittently and required an NG tube to be placed which did drain over a liter and a half of stomach contents and green bilious fluid. He became very anxious with the NG tube in place and wanted it removed as soon as possible yesterday morning even though we felt there still may be some drainage. He agreed to the risk of it being removed and for potential reinsertion if necessary, but he has actually done pretty well overall with conservative therapy. His bowel improved yesterday. He was able to tolerate a clear liquid diet. This morning, he had a bowel movement and feels much better. He feels his bloating and pain have completely resolved. He is ready to go home and is fully dressed. I asked for him to order some regular food this morning for breakfast which he has already had and done well with.   Denies any further nausea. He has had no vomiting since admission. He denies any abdominal pain, bloating, or indigestion pain and he wants to go home. So with that said, it appears his partial small-bowel obstruction has improved. A CT scan on the 21st showed dilated small bowel loops with transitional zone in the right midabdomen, most likely partial small-bowel obstruction. There was some diverticula in the colon. Stomach was distended with fluid. His chemistry looks normal this morning. LFTs are unremarkable. His white count was normal since admission. H and H is 17.3 and 50, platelets are normal at 235 and at this point, he is hemodynamically within normal limits. His blood pressure is 127/70, pulse 71, temperature 98.5, respiratory rate 18, SaO2 97%. Abdomen is soft, nontender. Few scattered normal bowel sounds. No distention or rebound this morning. Lungs and cardiac exam within normal limits and his mentation is appropriate. He is up and ambulatory. We did give him Reglan intermittently IV as well as Protonix. I will change him over to oral Protonix for another 2 weeks for him to take one daily before breakfast.  Otherwise he will resume all of his other home medications to include his blood pressure medicine:  1. Aspirin 81 mg daily. 2.  Lisinopril. 3.  Cozaar. 4.  Fish oil tablets. 5.  Statin. 6.  Protonix. 7.  Verapamil that he is on at home per previous instructions, otherwise the new medicine is Protonix 40 mg daily before breakfast.    DISCHARGE INSTRUCTIONS:  Have been given on small-bowel obstruction and the diet to adhere to thereafter. We talked about a bland, easily digestible diet for the next few days until he is feeling completely back to normal.  Otherwise, he is improved and stable to discharge from the hospital today. 35 minutes on discharge time in discussion with the patient. Please follow up with Dr. Brianna Hoff within the next 2-3 days in the office.       SANTA Saldana, MD [email or web address]  D:  06/23/2019 11:49  T:  06/23/2019 11:51  JOB #:  [id number]  CC:  Crys Bradshaw MD